# Patient Record
Sex: FEMALE | Race: WHITE | NOT HISPANIC OR LATINO | URBAN - METROPOLITAN AREA
[De-identification: names, ages, dates, MRNs, and addresses within clinical notes are randomized per-mention and may not be internally consistent; named-entity substitution may affect disease eponyms.]

---

## 2018-10-29 ENCOUNTER — EMERGENCY (EMERGENCY)
Facility: HOSPITAL | Age: 23
LOS: 1 days | Discharge: HOME | End: 2018-10-29
Attending: EMERGENCY MEDICINE
Payer: COMMERCIAL

## 2018-10-29 VITALS
TEMPERATURE: 98 F | WEIGHT: 190.04 LBS | RESPIRATION RATE: 17 BRPM | OXYGEN SATURATION: 97 % | HEIGHT: 64 IN | SYSTOLIC BLOOD PRESSURE: 117 MMHG | DIASTOLIC BLOOD PRESSURE: 77 MMHG | HEART RATE: 105 BPM

## 2018-10-29 VITALS
SYSTOLIC BLOOD PRESSURE: 106 MMHG | DIASTOLIC BLOOD PRESSURE: 70 MMHG | OXYGEN SATURATION: 99 % | TEMPERATURE: 98 F | HEART RATE: 62 BPM | RESPIRATION RATE: 18 BRPM

## 2018-10-29 LAB
ALBUMIN SERPL ELPH-MCNC: 4.5 G/DL — SIGNIFICANT CHANGE UP (ref 3.3–5)
ALP SERPL-CCNC: 189 U/L — HIGH (ref 40–120)
ALT FLD-CCNC: 40 U/L — SIGNIFICANT CHANGE UP (ref 10–45)
ANION GAP SERPL CALC-SCNC: 14 MMOL/L — SIGNIFICANT CHANGE UP (ref 5–17)
APPEARANCE UR: CLEAR — SIGNIFICANT CHANGE UP
AST SERPL-CCNC: 22 U/L — SIGNIFICANT CHANGE UP (ref 10–40)
BACTERIA # UR AUTO: NEGATIVE — SIGNIFICANT CHANGE UP
BASOPHILS # BLD AUTO: 0.1 K/UL — SIGNIFICANT CHANGE UP (ref 0–0.2)
BASOPHILS NFR BLD AUTO: 0.5 % — SIGNIFICANT CHANGE UP (ref 0–2)
BILIRUB SERPL-MCNC: 0.5 MG/DL — SIGNIFICANT CHANGE UP (ref 0.2–1.2)
BILIRUB UR-MCNC: NEGATIVE — SIGNIFICANT CHANGE UP
BUN SERPL-MCNC: 20 MG/DL — SIGNIFICANT CHANGE UP (ref 7–23)
CALCIUM SERPL-MCNC: 9.7 MG/DL — SIGNIFICANT CHANGE UP (ref 8.4–10.5)
CHLORIDE SERPL-SCNC: 103 MMOL/L — SIGNIFICANT CHANGE UP (ref 96–108)
CO2 SERPL-SCNC: 21 MMOL/L — LOW (ref 22–31)
COLOR SPEC: YELLOW — SIGNIFICANT CHANGE UP
CREAT SERPL-MCNC: 1.58 MG/DL — HIGH (ref 0.5–1.3)
DIFF PNL FLD: ABNORMAL
EOSINOPHIL # BLD AUTO: 0.1 K/UL — SIGNIFICANT CHANGE UP (ref 0–0.5)
EOSINOPHIL NFR BLD AUTO: 0.5 % — SIGNIFICANT CHANGE UP (ref 0–6)
EPI CELLS # UR: 7 /HPF — HIGH
GLUCOSE SERPL-MCNC: 92 MG/DL — SIGNIFICANT CHANGE UP (ref 70–99)
GLUCOSE UR QL: NEGATIVE — SIGNIFICANT CHANGE UP
HCT VFR BLD CALC: 45 % — SIGNIFICANT CHANGE UP (ref 34.5–45)
HGB BLD-MCNC: 16.1 G/DL — HIGH (ref 11.5–15.5)
HYALINE CASTS # UR AUTO: 2 /LPF — SIGNIFICANT CHANGE UP (ref 0–2)
KETONES UR-MCNC: NEGATIVE — SIGNIFICANT CHANGE UP
LEUKOCYTE ESTERASE UR-ACNC: NEGATIVE — SIGNIFICANT CHANGE UP
LIDOCAIN IGE QN: 19 U/L — SIGNIFICANT CHANGE UP (ref 7–60)
LYMPHOCYTES # BLD AUTO: 17.1 % — SIGNIFICANT CHANGE UP (ref 13–44)
LYMPHOCYTES # BLD AUTO: 2.6 K/UL — SIGNIFICANT CHANGE UP (ref 1–3.3)
MCHC RBC-ENTMCNC: 31.8 PG — SIGNIFICANT CHANGE UP (ref 27–34)
MCHC RBC-ENTMCNC: 35.7 GM/DL — SIGNIFICANT CHANGE UP (ref 32–36)
MCV RBC AUTO: 89.2 FL — SIGNIFICANT CHANGE UP (ref 80–100)
MONOCYTES # BLD AUTO: 1.4 K/UL — HIGH (ref 0–0.9)
MONOCYTES NFR BLD AUTO: 9.1 % — SIGNIFICANT CHANGE UP (ref 2–14)
NEUTROPHILS # BLD AUTO: 11.2 K/UL — HIGH (ref 1.8–7.4)
NEUTROPHILS NFR BLD AUTO: 72.8 % — SIGNIFICANT CHANGE UP (ref 43–77)
NITRITE UR-MCNC: NEGATIVE — SIGNIFICANT CHANGE UP
PH UR: 6 — SIGNIFICANT CHANGE UP (ref 5–8)
PLATELET # BLD AUTO: 341 K/UL — SIGNIFICANT CHANGE UP (ref 150–400)
POTASSIUM SERPL-MCNC: 3.9 MMOL/L — SIGNIFICANT CHANGE UP (ref 3.5–5.3)
POTASSIUM SERPL-SCNC: 3.9 MMOL/L — SIGNIFICANT CHANGE UP (ref 3.5–5.3)
PROT SERPL-MCNC: 7.5 G/DL — SIGNIFICANT CHANGE UP (ref 6–8.3)
PROT UR-MCNC: ABNORMAL
RBC # BLD: 5.05 M/UL — SIGNIFICANT CHANGE UP (ref 3.8–5.2)
RBC # FLD: 11.3 % — SIGNIFICANT CHANGE UP (ref 10.3–14.5)
RBC CASTS # UR COMP ASSIST: 14 /HPF — HIGH (ref 0–4)
SODIUM SERPL-SCNC: 138 MMOL/L — SIGNIFICANT CHANGE UP (ref 135–145)
SP GR SPEC: 1.03 — HIGH (ref 1.01–1.02)
UROBILINOGEN FLD QL: NEGATIVE — SIGNIFICANT CHANGE UP
WBC # BLD: 15.4 K/UL — HIGH (ref 3.8–10.5)
WBC # FLD AUTO: 15.4 K/UL — HIGH (ref 3.8–10.5)
WBC UR QL: 3 /HPF — SIGNIFICANT CHANGE UP (ref 0–5)

## 2018-10-29 PROCEDURE — 81001 URINALYSIS AUTO W/SCOPE: CPT

## 2018-10-29 PROCEDURE — 85027 COMPLETE CBC AUTOMATED: CPT

## 2018-10-29 PROCEDURE — 80053 COMPREHEN METABOLIC PANEL: CPT

## 2018-10-29 PROCEDURE — 76770 US EXAM ABDO BACK WALL COMP: CPT | Mod: 26

## 2018-10-29 PROCEDURE — 76775 US EXAM ABDO BACK WALL LIM: CPT | Mod: 26

## 2018-10-29 PROCEDURE — 83690 ASSAY OF LIPASE: CPT

## 2018-10-29 PROCEDURE — 96361 HYDRATE IV INFUSION ADD-ON: CPT

## 2018-10-29 PROCEDURE — 76775 US EXAM ABDO BACK WALL LIM: CPT

## 2018-10-29 PROCEDURE — 99284 EMERGENCY DEPT VISIT MOD MDM: CPT | Mod: 25

## 2018-10-29 PROCEDURE — 99284 EMERGENCY DEPT VISIT MOD MDM: CPT

## 2018-10-29 PROCEDURE — 96374 THER/PROPH/DIAG INJ IV PUSH: CPT

## 2018-10-29 RX ORDER — SODIUM CHLORIDE 9 MG/ML
1000 INJECTION INTRAMUSCULAR; INTRAVENOUS; SUBCUTANEOUS ONCE
Qty: 0 | Refills: 0 | Status: COMPLETED | OUTPATIENT
Start: 2018-10-29 | End: 2018-10-29

## 2018-10-29 RX ORDER — OXYCODONE HYDROCHLORIDE 5 MG/1
1 TABLET ORAL
Qty: 6 | Refills: 0 | OUTPATIENT
Start: 2018-10-29 | End: 2018-10-29

## 2018-10-29 RX ORDER — KETOROLAC TROMETHAMINE 30 MG/ML
30 SYRINGE (ML) INJECTION ONCE
Qty: 0 | Refills: 0 | Status: DISCONTINUED | OUTPATIENT
Start: 2018-10-29 | End: 2018-10-29

## 2018-10-29 RX ORDER — IBUPROFEN 200 MG
1 TABLET ORAL
Qty: 30 | Refills: 0 | OUTPATIENT
Start: 2018-10-29 | End: 2018-11-07

## 2018-10-29 RX ADMIN — Medication 30 MILLIGRAM(S): at 19:00

## 2018-10-29 RX ADMIN — SODIUM CHLORIDE 1000 MILLILITER(S): 9 INJECTION INTRAMUSCULAR; INTRAVENOUS; SUBCUTANEOUS at 19:30

## 2018-10-29 RX ADMIN — Medication 30 MILLIGRAM(S): at 15:05

## 2018-10-29 RX ADMIN — SODIUM CHLORIDE 2000 MILLILITER(S): 9 INJECTION INTRAMUSCULAR; INTRAVENOUS; SUBCUTANEOUS at 17:20

## 2018-10-29 RX ADMIN — SODIUM CHLORIDE 2000 MILLILITER(S): 9 INJECTION INTRAMUSCULAR; INTRAVENOUS; SUBCUTANEOUS at 15:04

## 2018-10-29 RX ADMIN — SODIUM CHLORIDE 1000 MILLILITER(S): 9 INJECTION INTRAMUSCULAR; INTRAVENOUS; SUBCUTANEOUS at 17:00

## 2018-10-29 NOTE — ED PROVIDER NOTE - MEDICAL DECISION MAKING DETAILS
22 year old female with flank pain, well appearing presents with mild mid abdominal tenderness, not fully consistent with stone. Unclear cause, No concern for intraabdominal infection. Requiring immediate treatment. Plan: will check labs, UA and re-evaluate.

## 2018-10-29 NOTE — ED PROVIDER NOTE - MUSCULOSKELETAL, MLM
Spine appears normal, range of motion is not limited, no muscle or joint tenderness Spine appears normal, range of motion is not limited, no muscle or joint tenderness, no midline spine tenderness

## 2018-10-29 NOTE — ED ADULT NURSE NOTE - NSIMPLEMENTINTERV_GEN_ALL_ED
Implemented All Universal Safety Interventions:  [place] to call system. Call bell, personal items and telephone within reach. Instruct patient to call for assistance. Room bathroom lighting operational. Non-slip footwear when patient is off stretcher. Physically safe environment: no spills, clutter or unnecessary equipment. Stretcher in lowest position, wheels locked, appropriate side rails in place.

## 2018-10-29 NOTE — ED PROVIDER NOTE - OBJECTIVE STATEMENT
22 year old female with pmhx of Anxiety, Attention deficit disorder, OCD and no pshx presents with 10/10 bilateral flank L worsen than R started yesterday worsening when lying down and improves with movement. Patient reports being on Doxycycline for lyme disease which is completed and also on Ceftin for wisdom teeth for x1 week but self-discontinued yesterday. Patient reports feeling nauseous en route to ED. Per patient's mother, had CT scan where it shows kidney inflamed in the past for similar pain in the past. Denies fever or chills. Denies diarrhea, vomiting, or constipation. Denies hematuria, dysuria or burning sensation urinating. No family hx of kidney stones or kidney infection. NKDA. Took Advil yesterday with no relief. 22 year old female with pmhx of Anxiety, Attention deficit disorder, OCD and no pshx presents with 10/10 bilateral flank L worsen than R started yesterday worsening when lying down and improves with movement. Patient reports being on Doxycycline for lyme disease which is completed and also on Ceftin for wisdom teeth for x1 week but self-discontinued yesterday. Patient reports feeling nauseous en route to ED. Per patient's mother, had CT scan in past x 2 where it shows kidney inflamed in the past for similar pain in the past - told they were likely kidney stones. Denies fever or chills. Denies diarrhea, vomiting, or constipation. Denies hematuria, dysuria or burning sensation urinating. No family hx of kidney stones or kidney infection. NKDA. Took Advil yesterday with no relief.

## 2018-10-29 NOTE — ED ADULT TRIAGE NOTE - CHIEF COMPLAINT QUOTE
left sided abd pain with radiation to the back, nausea with 1 episode fo vomiting - recently on doxycycline for lyme dx and ceftin for wisdom teeth

## 2018-10-29 NOTE — ED PROVIDER NOTE - ABDOMINAL EXAM
midline tenderness to L mid abdomen and R, no rebound or guarding, no tenderness to McBurney's point, no CVAT, no pain with movement, negative psoas sign, no midline tenderness mild tenderness to L mid abdomen and R, no rebound or guarding, no tenderness to McBurney's point, no CVAT, no pain with movement, negative psoas sign

## 2018-10-29 NOTE — ED PROVIDER NOTE - NS_ ATTENDINGSCRIBEDETAILS _ED_A_ED_FT
Vickie Garcia MD - Attending Physician: The scribe's documentation has been prepared under my direction and personally reviewed by me in its entirety. I confirm that the note above accurately reflects all work, treatment, procedures, and medical decision making performed by me.

## 2018-10-29 NOTE — ED PROVIDER NOTE - PROGRESS NOTE DETAILS
Pain improved. +Blood on UA, possible stone. US ordered r/o severe hydro Likely renal stone. Mild hydro. Noted Cr 1.5. Unknown baseline but with signs of mild dehydration. 2L NS given. Patient pain resolved and wants to go home. D/w patient and father need for follow-up with urology, repeat Cr levels. Discussed pain control and return precautions

## 2018-10-29 NOTE — ED ADULT NURSE NOTE - OBJECTIVE STATEMENT
23 y/o female with hx of kidney stone and migraine accompanied by mom c/o RUQ pain that radiates to right flank associated with n/v and constipation that initiated yesterday.  pt denies any melena or hematuria at this time.  pt is awake, alert and responsive to all stimuli.  no sob or respiratory distress noted.  pt was w/u and is awaiting md reeval.  will continue to monitor.

## 2018-11-08 ENCOUNTER — EMERGENCY (EMERGENCY)
Facility: HOSPITAL | Age: 23
LOS: 1 days | Discharge: HOME | End: 2018-11-08
Attending: EMERGENCY MEDICINE
Payer: COMMERCIAL

## 2018-11-08 VITALS
WEIGHT: 190.04 LBS | OXYGEN SATURATION: 99 % | SYSTOLIC BLOOD PRESSURE: 115 MMHG | DIASTOLIC BLOOD PRESSURE: 82 MMHG | TEMPERATURE: 98 F | HEIGHT: 64 IN | RESPIRATION RATE: 16 BRPM | HEART RATE: 85 BPM

## 2018-11-08 VITALS — RESPIRATION RATE: 16 BRPM | TEMPERATURE: 98 F | HEART RATE: 77 BPM | OXYGEN SATURATION: 99 %

## 2018-11-08 LAB
ALBUMIN SERPL ELPH-MCNC: 4.7 G/DL — SIGNIFICANT CHANGE UP (ref 3.3–5)
ALP SERPL-CCNC: 167 U/L — HIGH (ref 40–120)
ALT FLD-CCNC: 45 U/L — SIGNIFICANT CHANGE UP (ref 10–45)
ANION GAP SERPL CALC-SCNC: 15 MMOL/L — SIGNIFICANT CHANGE UP (ref 5–17)
APPEARANCE UR: CLEAR — SIGNIFICANT CHANGE UP
AST SERPL-CCNC: 25 U/L — SIGNIFICANT CHANGE UP (ref 10–40)
BACTERIA # UR AUTO: ABNORMAL
BASOPHILS # BLD AUTO: 0.1 K/UL — SIGNIFICANT CHANGE UP (ref 0–0.2)
BASOPHILS NFR BLD AUTO: 0.5 % — SIGNIFICANT CHANGE UP (ref 0–2)
BILIRUB SERPL-MCNC: 0.4 MG/DL — SIGNIFICANT CHANGE UP (ref 0.2–1.2)
BILIRUB UR-MCNC: NEGATIVE — SIGNIFICANT CHANGE UP
BUN SERPL-MCNC: 15 MG/DL — SIGNIFICANT CHANGE UP (ref 7–23)
CALCIUM SERPL-MCNC: 10.1 MG/DL — SIGNIFICANT CHANGE UP (ref 8.4–10.5)
CHLORIDE SERPL-SCNC: 106 MMOL/L — SIGNIFICANT CHANGE UP (ref 96–108)
CO2 SERPL-SCNC: 21 MMOL/L — LOW (ref 22–31)
COLOR SPEC: YELLOW — SIGNIFICANT CHANGE UP
CREAT SERPL-MCNC: 1.09 MG/DL — SIGNIFICANT CHANGE UP (ref 0.5–1.3)
DIFF PNL FLD: NEGATIVE — SIGNIFICANT CHANGE UP
EOSINOPHIL # BLD AUTO: 0.1 K/UL — SIGNIFICANT CHANGE UP (ref 0–0.5)
EOSINOPHIL NFR BLD AUTO: 0.5 % — SIGNIFICANT CHANGE UP (ref 0–6)
EPI CELLS # UR: 3 /HPF — SIGNIFICANT CHANGE UP
GAS PNL BLDV: SIGNIFICANT CHANGE UP
GLUCOSE SERPL-MCNC: 88 MG/DL — SIGNIFICANT CHANGE UP (ref 70–99)
GLUCOSE UR QL: NEGATIVE — SIGNIFICANT CHANGE UP
HCG UR QL: NEGATIVE — SIGNIFICANT CHANGE UP
HCT VFR BLD CALC: 43.9 % — SIGNIFICANT CHANGE UP (ref 34.5–45)
HGB BLD-MCNC: 16.2 G/DL — HIGH (ref 11.5–15.5)
HYALINE CASTS # UR AUTO: 0 /LPF — SIGNIFICANT CHANGE UP (ref 0–2)
KETONES UR-MCNC: NEGATIVE — SIGNIFICANT CHANGE UP
LEUKOCYTE ESTERASE UR-ACNC: NEGATIVE — SIGNIFICANT CHANGE UP
LYMPHOCYTES # BLD AUTO: 25 % — SIGNIFICANT CHANGE UP (ref 13–44)
LYMPHOCYTES # BLD AUTO: 3.8 K/UL — HIGH (ref 1–3.3)
MCHC RBC-ENTMCNC: 32.7 PG — SIGNIFICANT CHANGE UP (ref 27–34)
MCHC RBC-ENTMCNC: 37 GM/DL — HIGH (ref 32–36)
MCV RBC AUTO: 88.4 FL — SIGNIFICANT CHANGE UP (ref 80–100)
MONOCYTES # BLD AUTO: 0.9 K/UL — SIGNIFICANT CHANGE UP (ref 0–0.9)
MONOCYTES NFR BLD AUTO: 5.7 % — SIGNIFICANT CHANGE UP (ref 2–14)
NEUTROPHILS # BLD AUTO: 10.3 K/UL — HIGH (ref 1.8–7.4)
NEUTROPHILS NFR BLD AUTO: 68.2 % — SIGNIFICANT CHANGE UP (ref 43–77)
NITRITE UR-MCNC: NEGATIVE — SIGNIFICANT CHANGE UP
PH UR: 6 — SIGNIFICANT CHANGE UP (ref 5–8)
PLATELET # BLD AUTO: 353 K/UL — SIGNIFICANT CHANGE UP (ref 150–400)
POTASSIUM SERPL-MCNC: 4.1 MMOL/L — SIGNIFICANT CHANGE UP (ref 3.5–5.3)
POTASSIUM SERPL-SCNC: 4.1 MMOL/L — SIGNIFICANT CHANGE UP (ref 3.5–5.3)
PROT SERPL-MCNC: 7.6 G/DL — SIGNIFICANT CHANGE UP (ref 6–8.3)
PROT UR-MCNC: ABNORMAL
RBC # BLD: 4.97 M/UL — SIGNIFICANT CHANGE UP (ref 3.8–5.2)
RBC # FLD: 10.5 % — SIGNIFICANT CHANGE UP (ref 10.3–14.5)
RBC CASTS # UR COMP ASSIST: 5 /HPF — HIGH (ref 0–4)
SODIUM SERPL-SCNC: 142 MMOL/L — SIGNIFICANT CHANGE UP (ref 135–145)
SP GR SPEC: 1.02 — SIGNIFICANT CHANGE UP (ref 1.01–1.02)
UROBILINOGEN FLD QL: NEGATIVE — SIGNIFICANT CHANGE UP
WBC # BLD: 15.1 K/UL — HIGH (ref 3.8–10.5)
WBC # FLD AUTO: 15.1 K/UL — HIGH (ref 3.8–10.5)
WBC UR QL: 3 /HPF — SIGNIFICANT CHANGE UP (ref 0–5)

## 2018-11-08 PROCEDURE — 84132 ASSAY OF SERUM POTASSIUM: CPT

## 2018-11-08 PROCEDURE — 82330 ASSAY OF CALCIUM: CPT

## 2018-11-08 PROCEDURE — 96374 THER/PROPH/DIAG INJ IV PUSH: CPT

## 2018-11-08 PROCEDURE — 74176 CT ABD & PELVIS W/O CONTRAST: CPT

## 2018-11-08 PROCEDURE — 82803 BLOOD GASES ANY COMBINATION: CPT

## 2018-11-08 PROCEDURE — 84295 ASSAY OF SERUM SODIUM: CPT

## 2018-11-08 PROCEDURE — 80053 COMPREHEN METABOLIC PANEL: CPT

## 2018-11-08 PROCEDURE — 99284 EMERGENCY DEPT VISIT MOD MDM: CPT

## 2018-11-08 PROCEDURE — 85014 HEMATOCRIT: CPT

## 2018-11-08 PROCEDURE — 76770 US EXAM ABDO BACK WALL COMP: CPT

## 2018-11-08 PROCEDURE — 99284 EMERGENCY DEPT VISIT MOD MDM: CPT | Mod: 25

## 2018-11-08 PROCEDURE — 81001 URINALYSIS AUTO W/SCOPE: CPT

## 2018-11-08 PROCEDURE — 82947 ASSAY GLUCOSE BLOOD QUANT: CPT

## 2018-11-08 PROCEDURE — 85027 COMPLETE CBC AUTOMATED: CPT

## 2018-11-08 PROCEDURE — 83605 ASSAY OF LACTIC ACID: CPT

## 2018-11-08 PROCEDURE — 96361 HYDRATE IV INFUSION ADD-ON: CPT

## 2018-11-08 PROCEDURE — 82435 ASSAY OF BLOOD CHLORIDE: CPT

## 2018-11-08 PROCEDURE — 76770 US EXAM ABDO BACK WALL COMP: CPT | Mod: 26

## 2018-11-08 PROCEDURE — 82565 ASSAY OF CREATININE: CPT

## 2018-11-08 PROCEDURE — 81025 URINE PREGNANCY TEST: CPT

## 2018-11-08 PROCEDURE — 74176 CT ABD & PELVIS W/O CONTRAST: CPT | Mod: 26

## 2018-11-08 PROCEDURE — 87086 URINE CULTURE/COLONY COUNT: CPT

## 2018-11-08 RX ORDER — ONDANSETRON 8 MG/1
4 TABLET, FILM COATED ORAL ONCE
Qty: 0 | Refills: 0 | Status: COMPLETED | OUTPATIENT
Start: 2018-11-08 | End: 2018-11-08

## 2018-11-08 RX ORDER — SODIUM CHLORIDE 9 MG/ML
2000 INJECTION INTRAMUSCULAR; INTRAVENOUS; SUBCUTANEOUS ONCE
Qty: 0 | Refills: 0 | Status: COMPLETED | OUTPATIENT
Start: 2018-11-08 | End: 2018-11-08

## 2018-11-08 RX ORDER — ACETAMINOPHEN 500 MG
975 TABLET ORAL ONCE
Qty: 0 | Refills: 0 | Status: COMPLETED | OUTPATIENT
Start: 2018-11-08 | End: 2018-11-08

## 2018-11-08 RX ADMIN — SODIUM CHLORIDE 2000 MILLILITER(S): 9 INJECTION INTRAMUSCULAR; INTRAVENOUS; SUBCUTANEOUS at 11:32

## 2018-11-08 RX ADMIN — Medication 975 MILLIGRAM(S): at 10:31

## 2018-11-08 RX ADMIN — SODIUM CHLORIDE 2000 MILLILITER(S): 9 INJECTION INTRAMUSCULAR; INTRAVENOUS; SUBCUTANEOUS at 10:31

## 2018-11-08 RX ADMIN — ONDANSETRON 4 MILLIGRAM(S): 8 TABLET, FILM COATED ORAL at 10:32

## 2018-11-08 RX ADMIN — Medication 975 MILLIGRAM(S): at 11:32

## 2018-11-08 NOTE — ED PROVIDER NOTE - PROGRESS NOTE DETAILS
shared deciosn making w pt and mother requesting ct, ct within normal limits, no hydronephrosis, no nephrolithiasis, normal pelvic organs.  Cr improved from last visit, now normal.  Patient states she is feeling better, pain improved.  Patient is now sleeping comfortably in bed.  Discussed normal findings with mother and patient who expressed understanding.  Stated that patient could still have nephrolithiasis even with normal ct and patient has fu with her urologist tomorrow.  Low suspicion for pelvic pathology given normal ct, patient is a virgin so will not perform transvaginal and consider transabdominal low yield given low level of suspicion and likely diagnosis of kidney stone given history and recent hydro.  Patient stable for discharge and has good fu and return precautions should pain return. Tolerating PO.

## 2018-11-08 NOTE — ED PROVIDER NOTE - OBJECTIVE STATEMENT
22 year old female with pmhx of Anxiety, Attention deficit disorder, OCD and no pshx presents with 1d h/o sharp L flank pain with no radiation.  No aggravating or alleviating factors.  A/w nausea, no vomiting.  States she had similar pain last week for which she was seen in ED and have U/S showing mild L hydronephrosis and Cr 1.53 and d/w presumed kidney stone, had fu with her urologist and pain improved.  No pain is back.   Patient reports being on Doxycycline for lyme disease which is completed and also on Ceftin for wisdom teeth for x1 week but self-discontinued last week.   Has had CT scans in past which showed kidney inflammation and presumed stones.  Denies fever or chills. Denies diarrhea, vomiting, or constipation. Denies hematuria, dysuria or burning sensation urinating. 22 year old female with pmhx of Anxiety, Attention deficit disorder, OCD and no pshx presents with 1d h/o sharp L flank pain with no radiation.  No aggravating or alleviating factors.  A/w nausea, no vomiting.  States she had similar pain last week for which she was seen in ED and have U/S showing mild L hydronephrosis and Cr 1.53 and d/w presumed kidney stone, had fu with her urologist and pain improved.  No pain is back.   Patient reports being on Doxycycline for lyme disease which is completed and also on Ceftin for wisdom teeth for x1 week but self-discontinued last week.   Has had CT scans in past which showed kidney inflammation and presumed stones.  Denies fever or chills. Denies diarrhea, vomiting, or constipation. Denies hematuria, dysuria or burning sensation urinating.  Patient is a virgin.

## 2018-11-08 NOTE — ED ADULT NURSE NOTE - OBJECTIVE STATEMENT
Came in with abdominal pain. No distress. Breathing easy and non labored. Ambulatory. Pt not diaphoretic. No nausea or vomiting. in the ER at this time

## 2018-11-08 NOTE — ED PROVIDER NOTE - MEDICAL DECISION MAKING DETAILS
cathryn elise renal colic ho hematuira and flank pain on flomax now return of pain with n/v pos cvat - pocus to eval for hydro , iv gluids, not taking po -- antemetic and reeval

## 2018-11-08 NOTE — ED PROVIDER NOTE - PHYSICAL EXAMINATION
***GEN - well appearing; NAD   ***HEAD - NC/AT  ***EYES/NOSE - PEERL, EOMI, mucous membranes moist, no discharge   ***THROAT: Oral cavity and pharynx normal. No inflammation, swelling, exudate, or lesions.    ***NECK: supple, non-tender no lymphadenopathy  ***PULMONARY - CTA b/l, symmetric breath sounds.   ***CARDIAC- s1s2, RRR, no murmur  ***ABDOMEN - +BS, ND, NT, soft, no guarding, no rebound, no organomegaly  ***BACK - +L CVA tenderness, Normal  spine, no spinal TTP  ***EXTREMITIES - symmetric pulses, 2+ dp, capillary refill < 2 seconds, no clubbing, no cyanosis, no edema   ***SKIN - warm, dry, intact, no rash or bruising   ***NEUROLOGIC - a&o x3, CN 3-12 intact, sensation nl, motor 5/5 RUE/LUE/RLE/LLE

## 2018-11-08 NOTE — ED PROVIDER NOTE - CARE PLAN
Principal Discharge DX:	Flank pain Principal Discharge DX:	Flank pain  Assessment and plan of treatment:	1) You were here for flank pain.    2) Take motrin and tylenol as needed for pain.    3) Follow up with your primary doctor for further evaluation and to answer any questions you have.    4) Return to the emergency department if you experience worsening symptoms, pain, fever, chills, nausea, vomiting or other concerning symptoms.

## 2018-11-09 LAB
CULTURE RESULTS: SIGNIFICANT CHANGE UP
SPECIMEN SOURCE: SIGNIFICANT CHANGE UP

## 2018-11-10 NOTE — ED POST DISCHARGE NOTE - DETAILS
Discussed with patient and her mother Yenifer about UCx results.  Patient recently contacted by urgent care stating she had a + culture last week and they are sending her a prescription for Augmentin.  Encouraged patient and mother to take antibiotics as prescribed and to have repeat Urine culture with her PMD or urologist.  -Tonny Salinas PA-C

## 2019-07-24 NOTE — ED PROVIDER NOTE - PLAN OF CARE
1) You were here for flank pain.    2) Take motrin and tylenol as needed for pain.    3) Follow up with your primary doctor for further evaluation and to answer any questions you have.    4) Return to the emergency department if you experience worsening symptoms, pain, fever, chills, nausea, vomiting or other concerning symptoms. 22-Jul-2019

## 2020-03-01 ENCOUNTER — EMERGENCY (EMERGENCY)
Facility: HOSPITAL | Age: 25
LOS: 1 days | Discharge: HOME | End: 2020-03-01
Attending: STUDENT IN AN ORGANIZED HEALTH CARE EDUCATION/TRAINING PROGRAM
Payer: COMMERCIAL

## 2020-03-01 VITALS
SYSTOLIC BLOOD PRESSURE: 109 MMHG | HEART RATE: 100 BPM | TEMPERATURE: 98 F | RESPIRATION RATE: 18 BRPM | OXYGEN SATURATION: 98 % | WEIGHT: 177.91 LBS | DIASTOLIC BLOOD PRESSURE: 83 MMHG | HEIGHT: 64 IN

## 2020-03-01 LAB
ALBUMIN SERPL ELPH-MCNC: 4.5 G/DL — SIGNIFICANT CHANGE UP (ref 3.3–5)
ALP SERPL-CCNC: 107 U/L — SIGNIFICANT CHANGE UP (ref 40–120)
ALT FLD-CCNC: 66 U/L — HIGH (ref 10–45)
ANION GAP SERPL CALC-SCNC: 17 MMOL/L — SIGNIFICANT CHANGE UP (ref 5–17)
APPEARANCE UR: ABNORMAL
AST SERPL-CCNC: 40 U/L — SIGNIFICANT CHANGE UP (ref 10–40)
BACTERIA # UR AUTO: NEGATIVE — SIGNIFICANT CHANGE UP
BASOPHILS # BLD AUTO: 0.07 K/UL — SIGNIFICANT CHANGE UP (ref 0–0.2)
BASOPHILS NFR BLD AUTO: 0.5 % — SIGNIFICANT CHANGE UP (ref 0–2)
BILIRUB SERPL-MCNC: 0.2 MG/DL — SIGNIFICANT CHANGE UP (ref 0.2–1.2)
BILIRUB UR-MCNC: NEGATIVE — SIGNIFICANT CHANGE UP
BUN SERPL-MCNC: 18 MG/DL — SIGNIFICANT CHANGE UP (ref 7–23)
CALCIUM SERPL-MCNC: 10.1 MG/DL — SIGNIFICANT CHANGE UP (ref 8.4–10.5)
CHLORIDE SERPL-SCNC: 105 MMOL/L — SIGNIFICANT CHANGE UP (ref 96–108)
CO2 SERPL-SCNC: 20 MMOL/L — LOW (ref 22–31)
COLOR SPEC: YELLOW — SIGNIFICANT CHANGE UP
CREAT SERPL-MCNC: 0.88 MG/DL — SIGNIFICANT CHANGE UP (ref 0.5–1.3)
DIFF PNL FLD: NEGATIVE — SIGNIFICANT CHANGE UP
EOSINOPHIL # BLD AUTO: 0.52 K/UL — HIGH (ref 0–0.5)
EOSINOPHIL NFR BLD AUTO: 3.9 % — SIGNIFICANT CHANGE UP (ref 0–6)
EPI CELLS # UR: 4 /HPF — SIGNIFICANT CHANGE UP
GLUCOSE SERPL-MCNC: 102 MG/DL — HIGH (ref 70–99)
GLUCOSE UR QL: NEGATIVE — SIGNIFICANT CHANGE UP
HCT VFR BLD CALC: 44.1 % — SIGNIFICANT CHANGE UP (ref 34.5–45)
HGB BLD-MCNC: 15.2 G/DL — SIGNIFICANT CHANGE UP (ref 11.5–15.5)
HYALINE CASTS # UR AUTO: 1 /LPF — SIGNIFICANT CHANGE UP (ref 0–2)
IMM GRANULOCYTES NFR BLD AUTO: 0.2 % — SIGNIFICANT CHANGE UP (ref 0–1.5)
KETONES UR-MCNC: SIGNIFICANT CHANGE UP
LEUKOCYTE ESTERASE UR-ACNC: NEGATIVE — SIGNIFICANT CHANGE UP
LYMPHOCYTES # BLD AUTO: 22.6 % — SIGNIFICANT CHANGE UP (ref 13–44)
LYMPHOCYTES # BLD AUTO: 3.02 K/UL — SIGNIFICANT CHANGE UP (ref 1–3.3)
MCHC RBC-ENTMCNC: 30.6 PG — SIGNIFICANT CHANGE UP (ref 27–34)
MCHC RBC-ENTMCNC: 34.5 GM/DL — SIGNIFICANT CHANGE UP (ref 32–36)
MCV RBC AUTO: 88.7 FL — SIGNIFICANT CHANGE UP (ref 80–100)
MONOCYTES # BLD AUTO: 1.08 K/UL — HIGH (ref 0–0.9)
MONOCYTES NFR BLD AUTO: 8.1 % — SIGNIFICANT CHANGE UP (ref 2–14)
NEUTROPHILS # BLD AUTO: 8.63 K/UL — HIGH (ref 1.8–7.4)
NEUTROPHILS NFR BLD AUTO: 64.7 % — SIGNIFICANT CHANGE UP (ref 43–77)
NITRITE UR-MCNC: NEGATIVE — SIGNIFICANT CHANGE UP
NRBC # BLD: 0 /100 WBCS — SIGNIFICANT CHANGE UP (ref 0–0)
PH UR: 6 — SIGNIFICANT CHANGE UP (ref 5–8)
PLATELET # BLD AUTO: 420 K/UL — HIGH (ref 150–400)
POTASSIUM SERPL-MCNC: 3.5 MMOL/L — SIGNIFICANT CHANGE UP (ref 3.5–5.3)
POTASSIUM SERPL-SCNC: 3.5 MMOL/L — SIGNIFICANT CHANGE UP (ref 3.5–5.3)
PROT SERPL-MCNC: 7.3 G/DL — SIGNIFICANT CHANGE UP (ref 6–8.3)
PROT UR-MCNC: ABNORMAL
RBC # BLD: 4.97 M/UL — SIGNIFICANT CHANGE UP (ref 3.8–5.2)
RBC # FLD: 11.5 % — SIGNIFICANT CHANGE UP (ref 10.3–14.5)
RBC CASTS # UR COMP ASSIST: 12 /HPF — HIGH (ref 0–4)
SODIUM SERPL-SCNC: 142 MMOL/L — SIGNIFICANT CHANGE UP (ref 135–145)
SP GR SPEC: 1.03 — HIGH (ref 1.01–1.02)
UROBILINOGEN FLD QL: NEGATIVE — SIGNIFICANT CHANGE UP
WBC # BLD: 13.35 K/UL — HIGH (ref 3.8–10.5)
WBC # FLD AUTO: 13.35 K/UL — HIGH (ref 3.8–10.5)
WBC UR QL: 5 /HPF — SIGNIFICANT CHANGE UP (ref 0–5)

## 2020-03-01 PROCEDURE — 80053 COMPREHEN METABOLIC PANEL: CPT

## 2020-03-01 PROCEDURE — 81001 URINALYSIS AUTO W/SCOPE: CPT

## 2020-03-01 PROCEDURE — 96375 TX/PRO/DX INJ NEW DRUG ADDON: CPT

## 2020-03-01 PROCEDURE — 99284 EMERGENCY DEPT VISIT MOD MDM: CPT | Mod: 25

## 2020-03-01 PROCEDURE — 76770 US EXAM ABDO BACK WALL COMP: CPT | Mod: 26

## 2020-03-01 PROCEDURE — 76770 US EXAM ABDO BACK WALL COMP: CPT

## 2020-03-01 PROCEDURE — 87086 URINE CULTURE/COLONY COUNT: CPT

## 2020-03-01 PROCEDURE — 85027 COMPLETE CBC AUTOMATED: CPT

## 2020-03-01 PROCEDURE — 99285 EMERGENCY DEPT VISIT HI MDM: CPT

## 2020-03-01 PROCEDURE — 96374 THER/PROPH/DIAG INJ IV PUSH: CPT

## 2020-03-01 RX ORDER — KETOROLAC TROMETHAMINE 30 MG/ML
15 SYRINGE (ML) INJECTION ONCE
Refills: 0 | Status: DISCONTINUED | OUTPATIENT
Start: 2020-03-01 | End: 2020-03-01

## 2020-03-01 RX ORDER — SODIUM CHLORIDE 9 MG/ML
1000 INJECTION INTRAMUSCULAR; INTRAVENOUS; SUBCUTANEOUS ONCE
Refills: 0 | Status: COMPLETED | OUTPATIENT
Start: 2020-03-01 | End: 2020-03-01

## 2020-03-01 RX ORDER — ONDANSETRON 8 MG/1
4 TABLET, FILM COATED ORAL ONCE
Refills: 0 | Status: COMPLETED | OUTPATIENT
Start: 2020-03-01 | End: 2020-03-01

## 2020-03-01 RX ADMIN — ONDANSETRON 4 MILLIGRAM(S): 8 TABLET, FILM COATED ORAL at 22:22

## 2020-03-01 RX ADMIN — Medication 15 MILLIGRAM(S): at 23:27

## 2020-03-01 RX ADMIN — SODIUM CHLORIDE 1000 MILLILITER(S): 9 INJECTION INTRAMUSCULAR; INTRAVENOUS; SUBCUTANEOUS at 22:22

## 2020-03-01 NOTE — ED ADULT NURSE NOTE - OBJECTIVE STATEMENT
Pt is a 24y Female c/o R flank pain starting 1 week ago which worsened today. Pt PMHx Kidney stones, PCOS (taking Metformin), Anxiety, OCD, ADD. Pt states pain starts in he flank and travels to her R side. Pt complaining of Nausea and vomiting 1x. Pt denies sexual activity, changes in urine color, or pain on urination. Pt appears anxious. Pt prefers to go by [place]. Pt resting comfortably in bed with mother at bedside. Pt educated on call bell use and call bell placed at bedside. Pt safety maintained.

## 2020-03-01 NOTE — ED PROVIDER NOTE - NSFOLLOWUPINSTRUCTIONS_ED_ALL_ED_FT
1.  Stay Hydrated  2. Take zofran 4mgs every 6 hrs as needed for nausea.  Take Ibuprofen 600mgs every 6 hrs as needed for mild/moderate pain.    3.  Follow up with your PMD in 2-3 days       Follow up with Urology upon discharge.  4.  Return to the ER for worsening pain, persistent nausea/vomiting, fevers/chills or any other concerning symptoms 1.  Stay Hydrated  2. Take zofran 4mgs every 6 hrs as needed for nausea.  Take Ibuprofen 600mgs every 6 hrs as needed for mild/moderate pain.    3.  Follow up with your PMD in 2-3 days       Follow up with your Urologist upon discharge.  4.  Return to the ER for worsening pain, persistent nausea/vomiting, fevers/chills or any other concerning symptoms

## 2020-03-01 NOTE — ED ADULT NURSE NOTE - NSIMPLEMENTINTERV_GEN_ALL_ED
Implemented All Universal Safety Interventions:  [place] to call system. Call bell, personal items and telephone within reach. Instruct patient to call for assistance. Room bathroom lighting operational. Non-slip footwear when patient is off stretcher. Physically safe environment: no spills, clutter or unnecessary equipment. Stretcher in lowest position, wheels locked, appropriate side rails in place.

## 2020-03-01 NOTE — ED PROVIDER NOTE - PHYSICAL EXAMINATION
A&Ox3, NAD. NCAT. PERRL, EOMI. Neck supple, no LAD. Lungs CTAB. +S1S2, RRR, No m/r/g. Abd soft, NT/ND, +BS, no rebound or guarding. Extremities: cap refill <2, pulses in distal extremities 4+, no edema. Skin without rash. CN II-XII intact. Strength 5/5 UE/LE. Sensations intact throughout. Gait steady. No CVA TTP.

## 2020-03-01 NOTE — ED PROVIDER NOTE - ATTENDING CONTRIBUTION TO CARE
25 yo female with PMH OCD, anxiety, PCOS, recurrent kidney stones (never needing intervention) presenting with intermittent left flank pain radiating to abdomen x 1 week which intensified approx 2 hours ago. a/w nausea so didn't take medications at home. Denies f/c, vomiting, cp, sob, vaginal symptoms, diarrhea. Denies PSH in abdomen. patient reports feels similar to previous stone  AP - likely renal colic. will get ultrasound to eval for hydro. ua/labs, pain control. likely uro f/u

## 2020-03-01 NOTE — ED PROVIDER NOTE - PROGRESS NOTE DETAILS
patient feeling better. likely renal colic. tolerating PO here. pain controlled. patient and mom comfortable with discharge. urology follow up.

## 2020-03-01 NOTE — ED ADULT NURSE NOTE - PMH
Anxiety    Attention deficit disorder    Kidney stones    OCD (obsessive compulsive disorder)    PCOS (polycystic ovarian syndrome)

## 2020-03-01 NOTE — ED ADULT NURSE NOTE - PRO INTERPRETER NEED 2
Medicare Wellness Visit  Plan for Preventive Care    A good way for you to stay healthy is to use preventive care.  Medicare covers many services that can help you stay healthy.* The goal of these services is to find any health problems as quickly as possible. Finding problems early can help make them easier to treat.  Your personal plan below lists the services you may need and when they are due.     Health Maintenance Summary     Topic Due On Due Status Completed On    Medicare Wellness Visit Feb 24, 2018 Overdue Feb 24, 2017    IMMUNIZATION - DTaP/Tdap/Td Jun 4, 2009 Overdue Vladimir 3, 2009    Immunization-Influenza Sep 1, 2018 Not Due Feb 14, 2011    Depression Screening Jul 9, 2019 Not Due Jul 9, 2018      Completed Feb 22, 2016           Preventive Care for Women and Men    Heart Screenings (Cardiovascular):  · Blood tests are used to check your cholesterol, lipid and triglyceride levels. High levels can increase your risk for heart disease and stroke. High levels can be treated with medications, diet and exercise. Lowering your levels can help keep your heart and blood vessels healthy.  Your provider will order these tests if they are needed.    · An ultrasound is done to see if you have an abdominal aortic aneurysm (AAA).  This is an enlargement of one of the main blood vessels that delivers blood to the body.   In the [place], 9,000 deaths are caused by AAA.  You may not even know you have this problem and as many as 1 in 3 people will have a serious problem if it is not treated.  Early diagnosis allows for more effective treatment and cure.  If you have a family history of AAA or are a male age 65-75 who has smoked, you are at higher risk of an AAA.  Your provider can order this test, if needed.    Colorectal Screening:  · There are many tests that are used to check for cancer of your colon and rectum. You and your provider should discuss what test is best for you and when to have it done.  Options  include:  · Screening Colonoscopy: exam of the entire colon, seen through a flexible lighted tube.  · Flexible Sigmoidoscopy: exam of the last third (sigmoid portion) of the colon and rectum, seen through a flexible lighted tube.  · Cologuard DNA stool test: a sample of your stool is used to screen for cancer and unseen blood in your stool.  · Fecal Occult Blood Test: a sample of your stool is studied to find any unseen blood    Flu Shot:  · An immunization that helps to prevent influenza (the flu). You should get this every year. The best time to get the shot is in the fall.    Pneumococcal Shot:  • Vaccines are available that can help prevent pneumococcal disease, which is any type of infection caused by Streptococcus pneumoniae bacteria.   Their use can prevent some cases of pneumonia, meningitis, and sepsis. There are two types of pneumococcal vaccines:   o Conjugate vaccines (PCV-13 or Prevnar 13®) - helps protect against the 13 types of pneumococcal bacteria that are the most common causes of serious infections in children and adults.    o Polysaccharide vaccine (PPSV23 or Jekdlmgvn83®) - helps protect against 23 types of pneumococcal bacteria for patients who are recommended to get it.  These vaccines should be given at least 12 months apart.  A booster is usually not needed.     Hepatitis B Shot:  · An immunization that helps to protect people from getting Hepatitis B. Hepatitis B is a virus that spreads through contact with infected blood or body fluids. Many people with the virus do not have symptoms.  The virus can lead to serious problems, such as liver disease. Some people are at higher risk than others. Your doctor will tell you if you need this shot.     Diabetes Screening:  · A test to measure sugar (glucose) in your blood is called a fasting blood sugar. Fasting means you cannot have food or drink for at least 8 hours before the test. This test can detect diabetes long before you may notice  symptoms.    Glaucoma Screening:  · Glaucoma screening is performed by your eye doctor. The test measures the fluid pressure inside your eyes to determine if you have glaucoma.     Hepatitis C Screening:  · A blood test to see if you have the hepatitis C virus.  Hepatitis C attacks the liver and is a major cause of chronic liver disease.  Medicare will cover a single screening for all adults born between 1945 & 1965, or high risk patients (people who have injected illegal drugs or people who have had blood transfusions).  High risk patients who continue to inject illegal drugs can be screened for Hepatitis C every year.    Smoking and Tobacco-Use Cessation Counseling:  · Tobacco is the single greatest cause of disease and early death in our country today. Medication and counseling together can increase a person’s chance of quitting for good.   · Medicare covers two quitting attempts per year, with four counseling sessions per attempt (eight sessions in a 12 month period)    Preventive Screening tests for Women    Screening Mammograms and Breast Exams:  · An x-ray of your breasts to check for breast cancer before you or your doctor may be able to feel it.  If breast cancer is found early it can usually be treated with success.    Pelvic Exams and Pap Tests:  · An exam to check for cervical and vaginal cancer. A Pap test is a lab test in which cells are taken from your cervix and sent to the lab to look for signs of cervical cancer. If cancer of the cervix is found early, chances for a cure are good. Testing can generally end at age 65, or if a woman has a hysterectomy for a benign condition. Your provider may recommend more frequent testing if certain abnormal results are found.    Bone Mass Measurements:  · A painless x-ray of your bone density to see if you are at risk for a broken bone. Bone density refers to the thickness of bones or how tightly the bone tissue is packed.    Preventive Screening tests for  Men    Prostate Screening:  · PSA - Prostate Cancer blood test.  Experts do not recommend routine screening of healthy men with no signs or symptoms of prostate disease.  However, men should not ignore urinary symptoms, and should discuss their family history with their doctor.    *Medicare pays for many preventive services to keep you healthy. For some of these services, you might have to pay a deductible, coinsurance, and / or copayment.  The amounts vary depending on the type of services you need and the kind of Medicare health plan you have.               English

## 2020-03-01 NOTE — ED PROVIDER NOTE - PATIENT PORTAL LINK FT
You can access the FollowMyHealth Patient Portal offered by [facility] by registering at the following website: [email or web address]. By joining [email or web address]’s FollowMyHealth portal, you will also be able to view your health information using other applications (apps) compatible with our system.

## 2020-03-01 NOTE — ED PROVIDER NOTE - OBJECTIVE STATEMENT
25 yo female with PMH OCD, anxiety, PCOS, recurrent kidney stones (never needing intervention) presenting with intermittent left flank pain radiating to abdomen x 1 week which intensified approx 2 hours ago at rest and has been constant 7/10 pain since. Pain feels similar to previous kidney stones. + nausea, no vomiting, has not taken medications for the pain. No fevers, chills, dysuria, hematuria, diarrhea, constipation.

## 2020-03-02 VITALS
OXYGEN SATURATION: 100 % | HEART RATE: 70 BPM | RESPIRATION RATE: 18 BRPM | DIASTOLIC BLOOD PRESSURE: 75 MMHG | SYSTOLIC BLOOD PRESSURE: 96 MMHG

## 2020-03-02 RX ORDER — OXYCODONE HYDROCHLORIDE 5 MG/1
1 TABLET ORAL
Qty: 5 | Refills: 0
Start: 2020-03-02

## 2020-03-03 PROBLEM — N20.0 CALCULUS OF KIDNEY: Chronic | Status: ACTIVE | Noted: 2020-03-01

## 2020-03-03 PROBLEM — E28.2 POLYCYSTIC OVARIAN SYNDROME: Chronic | Status: ACTIVE | Noted: 2020-03-01

## 2020-03-03 LAB
CULTURE RESULTS: SIGNIFICANT CHANGE UP
SPECIMEN SOURCE: SIGNIFICANT CHANGE UP

## 2020-03-04 NOTE — ED POST DISCHARGE NOTE - DETAILS
lvm to call back admin line - Octavia Samaniego PA-C 3/5/20: pt contacted informed of contaminated urine. pt feeling better, states she passed a kidney stone, plans to follow up this week. return precautions given. - Quintne Whitfield PA-C

## 2020-07-18 ENCOUNTER — APPOINTMENT (OUTPATIENT)
Dept: ULTRASOUND IMAGING | Facility: CLINIC | Age: 25
End: 2020-07-18

## 2021-03-05 ENCOUNTER — NON-APPOINTMENT (OUTPATIENT)
Age: 26
End: 2021-03-05

## 2021-08-17 NOTE — ED ADULT TRIAGE NOTE - SPO2 (%)
[age]F hx breast CA on immunologics p/w several days nausea/vomiting - on exam appearing dehydrated. Low suspicion for acute intra-abdominal etiology - suspect secondary symptoms 2/2 treatment modality - will send basic labs w/ gas, antiemetic, fluids, cxr, ua.

## 2021-11-16 ENCOUNTER — APPOINTMENT (OUTPATIENT)
Dept: INTERNAL MEDICINE | Facility: CLINIC | Age: 26
End: 2021-11-16
Payer: COMMERCIAL

## 2021-11-16 VITALS
RESPIRATION RATE: 16 BRPM | HEART RATE: 90 BPM | SYSTOLIC BLOOD PRESSURE: 144 MMHG | BODY MASS INDEX: 24.31 KG/M2 | WEIGHT: 142.38 LBS | TEMPERATURE: 98 F | OXYGEN SATURATION: 99 % | HEIGHT: 64.37 IN | DIASTOLIC BLOOD PRESSURE: 77 MMHG

## 2021-11-16 DIAGNOSIS — F81.89 OTHER DEVELOPMENTAL DISORDERS OF SCHOLASTIC SKILLS: ICD-10-CM

## 2021-11-16 DIAGNOSIS — Z86.19 PERSONAL HISTORY OF OTHER INFECTIOUS AND PARASITIC DISEASES: ICD-10-CM

## 2021-11-16 DIAGNOSIS — Z83.438 FAMILY HISTORY OF OTHER DISORDER OF LIPOPROTEIN METABOLISM AND OTHER LIPIDEMIA: ICD-10-CM

## 2021-11-16 DIAGNOSIS — Z83.3 FAMILY HISTORY OF DIABETES MELLITUS: ICD-10-CM

## 2021-11-16 DIAGNOSIS — Z82.79 FAMILY HISTORY OF OTHER CONGENITAL MALFORMATIONS, DEFORMATIONS AND CHROMOSOMAL ABNORMALITIES: ICD-10-CM

## 2021-11-16 DIAGNOSIS — Z87.442 PERSONAL HISTORY OF URINARY CALCULI: ICD-10-CM

## 2021-11-16 DIAGNOSIS — G43.009 MIGRAINE W/OUT AURA, NOT INTRACTABLE, W/OUT STATUS MIGRAINOSUS: ICD-10-CM

## 2021-11-16 DIAGNOSIS — Z82.49 FAMILY HISTORY OF ISCHEMIC HEART DISEASE AND OTHER DISEASES OF THE CIRCULATORY SYSTEM: ICD-10-CM

## 2021-11-16 PROCEDURE — 99385 PREV VISIT NEW AGE 18-39: CPT | Mod: 25

## 2021-11-16 PROCEDURE — 36415 COLL VENOUS BLD VENIPUNCTURE: CPT

## 2021-11-16 PROCEDURE — 99203 OFFICE O/P NEW LOW 30 MIN: CPT | Mod: 25

## 2021-11-16 RX ORDER — METFORMIN ER 750 MG 750 MG/1
750 TABLET ORAL
Qty: 180 | Refills: 1 | Status: ACTIVE | COMMUNITY

## 2021-11-22 ENCOUNTER — APPOINTMENT (OUTPATIENT)
Dept: CARDIOLOGY | Facility: CLINIC | Age: 26
End: 2021-11-22

## 2021-11-24 NOTE — HISTORY OF PRESENT ILLNESS
[Parent] : parent [de-identified] : 24 y/o female patient is new to the office presents today with mother for annual CPE/fasting labs\par - HCM: never had PAP \par - f/u PCOS - following every 3-4 months with endocrinology, last week at  appointment increased Metformin to 750mg BID, stable \par - f/u OCD/ADHD/Learning disorder - compliant with medication, following with Dr. Moran psychiatrist monthly and therapist weekly\par - f/u chronic fatigue syndrome/hx of lyme disease - currently following with program in [facility] for chronic lyme disease, has already been given doxycycline >30 days which did not improve symptoms, frustrated about not having answers/diagnosis to symptoms

## 2021-11-24 NOTE — HEALTH RISK ASSESSMENT
[] : No [No] : In the past 12 months have you used drugs other than those required for medical reasons? No [0] : 2) Feeling down, depressed, or hopeless: Not at all (0) [PHQ-2 Negative - No further assessment needed] : PHQ-2 Negative - No further assessment needed [[id number]] : 0 [Change in mental status noted] : No change in mental status noted [None] : None [With Family] : lives with family [Unemployed] : unemployed [Single] : single [Sexually Active] : not sexually active [Feels Safe at Home] : Feels safe at home [Reports changes in hearing] : Reports no changes in hearing [Reports changes in vision] : Reports no changes in vision [Reports changes in dental health] : Reports no changes in dental health

## 2021-11-24 NOTE — REVIEW OF SYSTEMS
[Fatigue] : fatigue [Joint Pain] : joint pain [Joint Stiffness] : joint stiffness [Joint Swelling] : no joint swelling [Muscle Weakness] : muscle weakness [Muscle Pain] : muscle pain [Back Pain] : no back pain [Negative] : Heme/Lymph

## 2021-11-26 LAB
25(OH)D3 SERPL-MCNC: 41.9 NG/ML
ALBUMIN SERPL ELPH-MCNC: 4.7 G/DL
ALP BLD-CCNC: 83 U/L
ALT SERPL-CCNC: 11 U/L
ANION GAP SERPL CALC-SCNC: 11 MMOL/L
APPEARANCE: ABNORMAL
AST SERPL-CCNC: 9 U/L
BACTERIA: NEGATIVE
BASOPHILS # BLD AUTO: 0.06 K/UL
BASOPHILS NFR BLD AUTO: 0.6 %
BILIRUB SERPL-MCNC: 0.2 MG/DL
BILIRUBIN URINE: NEGATIVE
BLOOD URINE: NEGATIVE
BUN SERPL-MCNC: 20 MG/DL
CALCIUM SERPL-MCNC: 9.9 MG/DL
CHLORIDE SERPL-SCNC: 107 MMOL/L
CHOLEST SERPL-MCNC: 141 MG/DL
CO2 SERPL-SCNC: 23 MMOL/L
COLOR: YELLOW
CREAT SERPL-MCNC: 0.83 MG/DL
EOSINOPHIL # BLD AUTO: 0.16 K/UL
EOSINOPHIL NFR BLD AUTO: 1.6 %
FOLATE SERPL-MCNC: >20 NG/ML
GLUCOSE QUALITATIVE U: NEGATIVE
GLUCOSE SERPL-MCNC: 77 MG/DL
HCT VFR BLD CALC: 40.5 %
HDLC SERPL-MCNC: 46 MG/DL
HGB BLD-MCNC: 14 G/DL
HYALINE CASTS: 0 /LPF
IMM GRANULOCYTES NFR BLD AUTO: 0.2 %
IRON SATN MFR SERPL: 26 %
IRON SERPL-MCNC: 93 UG/DL
KETONES URINE: NEGATIVE
LDLC SERPL CALC-MCNC: 75 MG/DL
LEUKOCYTE ESTERASE URINE: NEGATIVE
LYMPHOCYTES # BLD AUTO: 2.92 K/UL
LYMPHOCYTES NFR BLD AUTO: 28.8 %
MAGNESIUM SERPL-MCNC: 2 MG/DL
MAN DIFF?: NORMAL
MCHC RBC-ENTMCNC: 32.3 PG
MCHC RBC-ENTMCNC: 34.6 GM/DL
MCV RBC AUTO: 93.3 FL
MICROSCOPIC-UA: NORMAL
MONOCYTES # BLD AUTO: 0.66 K/UL
MONOCYTES NFR BLD AUTO: 6.5 %
NEUTROPHILS # BLD AUTO: 6.31 K/UL
NEUTROPHILS NFR BLD AUTO: 62.3 %
NITRITE URINE: NEGATIVE
NONHDLC SERPL-MCNC: 95 MG/DL
PH URINE: 6.5
PHOSPHATE SERPL-MCNC: 4 MG/DL
PLATELET # BLD AUTO: 324 K/UL
POTASSIUM SERPL-SCNC: 4.3 MMOL/L
PROT SERPL-MCNC: 6.9 G/DL
PROTEIN URINE: NEGATIVE
RBC # BLD: 4.34 M/UL
RBC # FLD: 11.4 %
RED BLOOD CELLS URINE: 4 /HPF
SODIUM SERPL-SCNC: 141 MMOL/L
SPECIFIC GRAVITY URINE: 1.02
SQUAMOUS EPITHELIAL CELLS: 2 /HPF
T3FREE SERPL-MCNC: 2.56 PG/ML
T4 FREE SERPL-MCNC: 1.3 NG/DL
THYROGLOB AB SERPL-ACNC: <20 IU/ML
THYROPEROXIDASE AB SERPL IA-ACNC: 11.5 IU/ML
TIBC SERPL-MCNC: 364 UG/DL
TRIGL SERPL-MCNC: 104 MG/DL
TSH SERPL-ACNC: 1.41 UIU/ML
UIBC SERPL-MCNC: 271 UG/DL
UROBILINOGEN URINE: NORMAL
VIT B12 SERPL-MCNC: 492 PG/ML
WBC # FLD AUTO: 10.13 K/UL
WHITE BLOOD CELLS URINE: 1 /HPF

## 2022-11-18 ENCOUNTER — APPOINTMENT (OUTPATIENT)
Dept: INTERNAL MEDICINE | Facility: CLINIC | Age: 27
End: 2022-11-18

## 2022-11-18 VITALS
OXYGEN SATURATION: 99 % | DIASTOLIC BLOOD PRESSURE: 76 MMHG | WEIGHT: 139 LBS | SYSTOLIC BLOOD PRESSURE: 120 MMHG | HEIGHT: 64.37 IN | BODY MASS INDEX: 23.73 KG/M2 | RESPIRATION RATE: 16 BRPM | TEMPERATURE: 98 F | HEART RATE: 102 BPM

## 2022-11-18 DIAGNOSIS — E88.81 METABOLIC SYNDROME: ICD-10-CM

## 2022-11-18 DIAGNOSIS — E28.2 POLYCYSTIC OVARIAN SYNDROME: ICD-10-CM

## 2022-11-18 DIAGNOSIS — F90.9 ATTENTION-DEFICIT HYPERACTIVITY DISORDER, UNSPECIFIED TYPE: ICD-10-CM

## 2022-11-18 DIAGNOSIS — R53.82 CHRONIC FATIGUE, UNSPECIFIED: ICD-10-CM

## 2022-11-18 DIAGNOSIS — F42.9 OBSESSIVE-COMPULSIVE DISORDER, UNSPECIFIED: ICD-10-CM

## 2022-11-18 PROCEDURE — 99395 PREV VISIT EST AGE 18-39: CPT | Mod: 25

## 2022-11-18 PROCEDURE — G0444 DEPRESSION SCREEN ANNUAL: CPT | Mod: 59

## 2022-11-18 PROCEDURE — 36415 COLL VENOUS BLD VENIPUNCTURE: CPT

## 2022-11-18 RX ORDER — SUMATRIPTAN SUCCINATE 50 MG/1
50 TABLET, FILM COATED ORAL
Refills: 0 | Status: DISCONTINUED | COMMUNITY
End: 2022-11-18

## 2022-11-18 RX ORDER — FLUVOXAMINE MALEATE 50 MG/1
50 TABLET ORAL
Qty: 60 | Refills: 0 | Status: ACTIVE | COMMUNITY

## 2022-11-18 RX ORDER — FLUVOXAMINE MALEATE 100 MG/1
100 TABLET, FILM COATED ORAL
Qty: 90 | Refills: 0 | Status: DISCONTINUED | COMMUNITY
Start: 2022-09-13

## 2022-11-18 RX ORDER — SUMATRIPTAN 100 MG/1
100 TABLET, FILM COATED ORAL
Qty: 12 | Refills: 0 | Status: ACTIVE | COMMUNITY
Start: 2022-10-01

## 2022-11-18 RX ORDER — METHOCARBAMOL 750 MG/1
750 TABLET, FILM COATED ORAL
Qty: 20 | Refills: 0 | Status: ACTIVE | COMMUNITY
Start: 2022-09-09

## 2022-11-18 RX ORDER — TOPIRAMATE 50 MG/1
50 TABLET, FILM COATED ORAL
Qty: 180 | Refills: 0 | Status: DISCONTINUED | COMMUNITY
Start: 2022-04-08

## 2022-11-18 NOTE — HISTORY OF PRESENT ILLNESS
[de-identified] : 25 y/o female patient presents today for annual CPE/fasting labs\par - f/u PCOS - following with endocrinology, stable, remains on same dosage of Metformin \par - f/u OCD/ADHD/Learning disorder - compliant with medication, following with Dr. Moran psychiatrist monthly and therapist weekly\par - f/u chronic fatigue syndrome/hx of lyme disease - previously following with [facility] lyme [place] but mother felt they were more neurological and did not help her symptoms \par

## 2022-11-18 NOTE — HEALTH RISK ASSESSMENT
[Never] : Never [No] : In the past 12 months have you used drugs other than those required for medical reasons? No [0] : 2) Feeling down, depressed, or hopeless: Not at all (0) [PHQ-2 Negative - No further assessment needed] : PHQ-2 Negative - No further assessment needed [I have developed a follow-up plan documented below in the note.] : I have developed a follow-up plan documented below in the note. [[id number]] : 0 [Change in mental status noted] : No change in mental status noted [None] : None [With Family] : lives with family [Unemployed] : unemployed [Single] : single [Sexually Active] : not sexually active [Feels Safe at Home] : Feels safe at home [Reports changes in hearing] : Reports no changes in hearing [Reports changes in vision] : Reports no changes in vision [Reports changes in dental health] : Reports no changes in dental health

## 2022-12-03 ENCOUNTER — TRANSCRIPTION ENCOUNTER (OUTPATIENT)
Age: 27
End: 2022-12-03

## 2022-12-30 ENCOUNTER — APPOINTMENT (OUTPATIENT)
Dept: CT IMAGING | Facility: IMAGING CENTER | Age: 27
End: 2022-12-30
Payer: COMMERCIAL

## 2022-12-30 ENCOUNTER — RESULT REVIEW (OUTPATIENT)
Age: 27
End: 2022-12-30

## 2022-12-30 ENCOUNTER — OUTPATIENT (OUTPATIENT)
Dept: OUTPATIENT SERVICES | Facility: HOSPITAL | Age: 27
LOS: 1 days | End: 2022-12-30
Payer: COMMERCIAL

## 2022-12-30 DIAGNOSIS — Z00.8 ENCOUNTER FOR OTHER GENERAL EXAMINATION: ICD-10-CM

## 2022-12-30 PROCEDURE — 74178 CT ABD&PLV WO CNTR FLWD CNTR: CPT

## 2022-12-30 PROCEDURE — 74178 CT ABD&PLV WO CNTR FLWD CNTR: CPT | Mod: 26

## 2023-02-17 LAB
ALBUMIN SERPL ELPH-MCNC: 4.6 G/DL
ALP BLD-CCNC: 88 U/L
ALT SERPL-CCNC: 15 U/L
ANION GAP SERPL CALC-SCNC: 13 MMOL/L
APPEARANCE: ABNORMAL
AST SERPL-CCNC: 14 U/L
BACTERIA: NEGATIVE
BASOPHILS # BLD AUTO: 0.04 K/UL
BASOPHILS NFR BLD AUTO: 0.5 %
BILIRUB SERPL-MCNC: 0.3 MG/DL
BILIRUBIN URINE: NEGATIVE
BLOOD URINE: ABNORMAL
BUN SERPL-MCNC: 19 MG/DL
CALCIUM OXALATE CRYSTALS: ABNORMAL
CALCIUM SERPL-MCNC: 9.9 MG/DL
CHLORIDE SERPL-SCNC: 107 MMOL/L
CHOLEST SERPL-MCNC: 136 MG/DL
CO2 SERPL-SCNC: 22 MMOL/L
COLOR: YELLOW
CREAT SERPL-MCNC: 0.89 MG/DL
EGFR: 93 ML/MIN/1.73M2
EOSINOPHIL # BLD AUTO: 0.11 K/UL
EOSINOPHIL NFR BLD AUTO: 1.5 %
ESTIMATED AVERAGE GLUCOSE: 91 MG/DL
GLUCOSE QUALITATIVE U: NEGATIVE
GLUCOSE SERPL-MCNC: 71 MG/DL
HBA1C MFR BLD HPLC: 4.8 %
HCT VFR BLD CALC: 42.4 %
HDLC SERPL-MCNC: 46 MG/DL
HGB BLD-MCNC: 14.9 G/DL
HYALINE CASTS: 0 /LPF
IMM GRANULOCYTES NFR BLD AUTO: 0.1 %
INSULIN SERPL-MCNC: 3.6 UU/ML
IRON SATN MFR SERPL: 20 %
IRON SERPL-MCNC: 74 UG/DL
KETONES URINE: NEGATIVE
LDLC SERPL CALC-MCNC: 75 MG/DL
LEUKOCYTE ESTERASE URINE: ABNORMAL
LYMPHOCYTES # BLD AUTO: 2.79 K/UL
LYMPHOCYTES NFR BLD AUTO: 37.1 %
MAGNESIUM SERPL-MCNC: 2.1 MG/DL
MAN DIFF?: NORMAL
MCHC RBC-ENTMCNC: 32.5 PG
MCHC RBC-ENTMCNC: 35.1 GM/DL
MCV RBC AUTO: 92.6 FL
MICROSCOPIC-UA: NORMAL
MONOCYTES # BLD AUTO: 0.51 K/UL
MONOCYTES NFR BLD AUTO: 6.8 %
NEUTROPHILS # BLD AUTO: 4.06 K/UL
NEUTROPHILS NFR BLD AUTO: 54 %
NITRITE URINE: NEGATIVE
NONHDLC SERPL-MCNC: 90 MG/DL
PH URINE: 6.5
PLATELET # BLD AUTO: 316 K/UL
POTASSIUM SERPL-SCNC: 4.2 MMOL/L
PROT SERPL-MCNC: 6.8 G/DL
PROTEIN URINE: NORMAL
RBC # BLD: 4.58 M/UL
RBC # FLD: 12.1 %
RED BLOOD CELLS URINE: 16 /HPF
SODIUM SERPL-SCNC: 141 MMOL/L
SPECIFIC GRAVITY URINE: 1.02
SQUAMOUS EPITHELIAL CELLS: 6 /HPF
TIBC SERPL-MCNC: 373 UG/DL
TRIGL SERPL-MCNC: 78 MG/DL
TSH SERPL-ACNC: 2.82 UIU/ML
UIBC SERPL-MCNC: 299 UG/DL
UROBILINOGEN URINE: NORMAL
VIT B12 SERPL-MCNC: 340 PG/ML
WBC # FLD AUTO: 7.52 K/UL
WHITE BLOOD CELLS URINE: 9 /HPF

## 2023-03-28 ENCOUNTER — APPOINTMENT (OUTPATIENT)
Dept: ULTRASOUND IMAGING | Facility: CLINIC | Age: 28
End: 2023-03-28
Payer: COMMERCIAL

## 2023-03-28 PROCEDURE — 76641 ULTRASOUND BREAST COMPLETE: CPT | Mod: 50

## 2023-04-06 ENCOUNTER — APPOINTMENT (OUTPATIENT)
Dept: ULTRASOUND IMAGING | Facility: CLINIC | Age: 28
End: 2023-04-06
Payer: COMMERCIAL

## 2023-04-06 PROCEDURE — 19083 BX BREAST 1ST LESION US IMAG: CPT | Mod: LT

## 2023-04-06 PROCEDURE — A4648: CPT | Mod: 59

## 2023-09-06 ENCOUNTER — EMERGENCY (EMERGENCY)
Facility: HOSPITAL | Age: 28
LOS: 1 days | Discharge: HOME | End: 2023-09-06
Attending: EMERGENCY MEDICINE
Payer: COMMERCIAL

## 2023-09-06 VITALS
HEART RATE: 77 BPM | RESPIRATION RATE: 18 BRPM | DIASTOLIC BLOOD PRESSURE: 88 MMHG | TEMPERATURE: 98 F | OXYGEN SATURATION: 99 % | SYSTOLIC BLOOD PRESSURE: 111 MMHG

## 2023-09-06 VITALS
HEIGHT: 64 IN | HEART RATE: 105 BPM | TEMPERATURE: 98 F | SYSTOLIC BLOOD PRESSURE: 121 MMHG | RESPIRATION RATE: 20 BRPM | OXYGEN SATURATION: 97 % | DIASTOLIC BLOOD PRESSURE: 93 MMHG | WEIGHT: 151.9 LBS

## 2023-09-06 LAB
ALBUMIN SERPL ELPH-MCNC: 4.3 G/DL — SIGNIFICANT CHANGE UP (ref 3.3–5)
ALP SERPL-CCNC: 86 U/L — SIGNIFICANT CHANGE UP (ref 40–120)
ALT FLD-CCNC: 22 U/L — SIGNIFICANT CHANGE UP (ref 10–45)
ANION GAP SERPL CALC-SCNC: 12 MMOL/L — SIGNIFICANT CHANGE UP (ref 5–17)
AST SERPL-CCNC: 15 U/L — SIGNIFICANT CHANGE UP (ref 10–40)
BASE EXCESS BLDV CALC-SCNC: -1.1 MMOL/L — SIGNIFICANT CHANGE UP (ref -2–3)
BASOPHILS # BLD AUTO: 0.03 K/UL — SIGNIFICANT CHANGE UP (ref 0–0.2)
BASOPHILS NFR BLD AUTO: 0.4 % — SIGNIFICANT CHANGE UP (ref 0–2)
BILIRUB SERPL-MCNC: 0.3 MG/DL — SIGNIFICANT CHANGE UP (ref 0.2–1.2)
BUN SERPL-MCNC: 20 MG/DL — SIGNIFICANT CHANGE UP (ref 7–23)
CA-I SERPL-SCNC: 1.22 MMOL/L — SIGNIFICANT CHANGE UP (ref 1.15–1.33)
CALCIUM SERPL-MCNC: 9.4 MG/DL — SIGNIFICANT CHANGE UP (ref 8.4–10.5)
CHLORIDE BLDV-SCNC: 106 MMOL/L — SIGNIFICANT CHANGE UP (ref 96–108)
CHLORIDE SERPL-SCNC: 105 MMOL/L — SIGNIFICANT CHANGE UP (ref 96–108)
CO2 BLDV-SCNC: 26 MMOL/L — SIGNIFICANT CHANGE UP (ref 22–26)
CO2 SERPL-SCNC: 22 MMOL/L — SIGNIFICANT CHANGE UP (ref 22–31)
CREAT SERPL-MCNC: 0.8 MG/DL — SIGNIFICANT CHANGE UP (ref 0.5–1.3)
EGFR: 105 ML/MIN/1.73M2 — SIGNIFICANT CHANGE UP
EOSINOPHIL # BLD AUTO: 0.02 K/UL — SIGNIFICANT CHANGE UP (ref 0–0.5)
EOSINOPHIL NFR BLD AUTO: 0.3 % — SIGNIFICANT CHANGE UP (ref 0–6)
GAS PNL BLDV: 135 MMOL/L — LOW (ref 136–145)
GAS PNL BLDV: SIGNIFICANT CHANGE UP
GAS PNL BLDV: SIGNIFICANT CHANGE UP
GLUCOSE BLDV-MCNC: 91 MG/DL — SIGNIFICANT CHANGE UP (ref 70–99)
GLUCOSE SERPL-MCNC: 94 MG/DL — SIGNIFICANT CHANGE UP (ref 70–99)
HCG SERPL-ACNC: <2 MIU/ML — SIGNIFICANT CHANGE UP
HCO3 BLDV-SCNC: 24 MMOL/L — SIGNIFICANT CHANGE UP (ref 22–29)
HCT VFR BLD CALC: 40.6 % — SIGNIFICANT CHANGE UP (ref 34.5–45)
HCT VFR BLDA CALC: 42 % — SIGNIFICANT CHANGE UP (ref 34.5–46.5)
HGB BLD CALC-MCNC: 14.1 G/DL — SIGNIFICANT CHANGE UP (ref 11.7–16.1)
HGB BLD-MCNC: 13.9 G/DL — SIGNIFICANT CHANGE UP (ref 11.5–15.5)
IMM GRANULOCYTES NFR BLD AUTO: 0.3 % — SIGNIFICANT CHANGE UP (ref 0–0.9)
LACTATE BLDV-MCNC: 1.5 MMOL/L — SIGNIFICANT CHANGE UP (ref 0.5–2)
LYMPHOCYTES # BLD AUTO: 1.64 K/UL — SIGNIFICANT CHANGE UP (ref 1–3.3)
LYMPHOCYTES # BLD AUTO: 22.5 % — SIGNIFICANT CHANGE UP (ref 13–44)
MAGNESIUM SERPL-MCNC: 2 MG/DL — SIGNIFICANT CHANGE UP (ref 1.6–2.6)
MCHC RBC-ENTMCNC: 30 PG — SIGNIFICANT CHANGE UP (ref 27–34)
MCHC RBC-ENTMCNC: 34.2 GM/DL — SIGNIFICANT CHANGE UP (ref 32–36)
MCV RBC AUTO: 87.7 FL — SIGNIFICANT CHANGE UP (ref 80–100)
MONOCYTES # BLD AUTO: 0.45 K/UL — SIGNIFICANT CHANGE UP (ref 0–0.9)
MONOCYTES NFR BLD AUTO: 6.2 % — SIGNIFICANT CHANGE UP (ref 2–14)
NEUTROPHILS # BLD AUTO: 5.12 K/UL — SIGNIFICANT CHANGE UP (ref 1.8–7.4)
NEUTROPHILS NFR BLD AUTO: 70.3 % — SIGNIFICANT CHANGE UP (ref 43–77)
NRBC # BLD: 0 /100 WBCS — SIGNIFICANT CHANGE UP (ref 0–0)
PCO2 BLDV: 42 MMHG — SIGNIFICANT CHANGE UP (ref 39–42)
PH BLDV: 7.37 — SIGNIFICANT CHANGE UP (ref 7.32–7.43)
PHOSPHATE SERPL-MCNC: 1.9 MG/DL — LOW (ref 2.5–4.5)
PLATELET # BLD AUTO: 309 K/UL — SIGNIFICANT CHANGE UP (ref 150–400)
PO2 BLDV: 37 MMHG — SIGNIFICANT CHANGE UP (ref 25–45)
POTASSIUM BLDV-SCNC: 3.8 MMOL/L — SIGNIFICANT CHANGE UP (ref 3.5–5.1)
POTASSIUM SERPL-MCNC: 3.8 MMOL/L — SIGNIFICANT CHANGE UP (ref 3.5–5.3)
POTASSIUM SERPL-SCNC: 3.8 MMOL/L — SIGNIFICANT CHANGE UP (ref 3.5–5.3)
PROT SERPL-MCNC: 7.2 G/DL — SIGNIFICANT CHANGE UP (ref 6–8.3)
RBC # BLD: 4.63 M/UL — SIGNIFICANT CHANGE UP (ref 3.8–5.2)
RBC # FLD: 11.5 % — SIGNIFICANT CHANGE UP (ref 10.3–14.5)
SAO2 % BLDV: 65.5 % — LOW (ref 67–88)
SODIUM SERPL-SCNC: 139 MMOL/L — SIGNIFICANT CHANGE UP (ref 135–145)
WBC # BLD: 7.28 K/UL — SIGNIFICANT CHANGE UP (ref 3.8–10.5)
WBC # FLD AUTO: 7.28 K/UL — SIGNIFICANT CHANGE UP (ref 3.8–10.5)

## 2023-09-06 PROCEDURE — 84100 ASSAY OF PHOSPHORUS: CPT

## 2023-09-06 PROCEDURE — 96374 THER/PROPH/DIAG INJ IV PUSH: CPT

## 2023-09-06 PROCEDURE — 80053 COMPREHEN METABOLIC PANEL: CPT

## 2023-09-06 PROCEDURE — 84295 ASSAY OF SERUM SODIUM: CPT

## 2023-09-06 PROCEDURE — 82435 ASSAY OF BLOOD CHLORIDE: CPT

## 2023-09-06 PROCEDURE — 99284 EMERGENCY DEPT VISIT MOD MDM: CPT | Mod: 25

## 2023-09-06 PROCEDURE — 85014 HEMATOCRIT: CPT

## 2023-09-06 PROCEDURE — 85018 HEMOGLOBIN: CPT

## 2023-09-06 PROCEDURE — 82330 ASSAY OF CALCIUM: CPT

## 2023-09-06 PROCEDURE — 83735 ASSAY OF MAGNESIUM: CPT

## 2023-09-06 PROCEDURE — 83605 ASSAY OF LACTIC ACID: CPT

## 2023-09-06 PROCEDURE — 82947 ASSAY GLUCOSE BLOOD QUANT: CPT

## 2023-09-06 PROCEDURE — 84132 ASSAY OF SERUM POTASSIUM: CPT

## 2023-09-06 PROCEDURE — 82803 BLOOD GASES ANY COMBINATION: CPT

## 2023-09-06 PROCEDURE — 85025 COMPLETE CBC W/AUTO DIFF WBC: CPT

## 2023-09-06 PROCEDURE — 84702 CHORIONIC GONADOTROPIN TEST: CPT

## 2023-09-06 PROCEDURE — 96375 TX/PRO/DX INJ NEW DRUG ADDON: CPT

## 2023-09-06 PROCEDURE — 93005 ELECTROCARDIOGRAM TRACING: CPT

## 2023-09-06 PROCEDURE — 99285 EMERGENCY DEPT VISIT HI MDM: CPT

## 2023-09-06 RX ORDER — DIPHENHYDRAMINE HCL 50 MG
25 CAPSULE ORAL ONCE
Refills: 0 | Status: COMPLETED | OUTPATIENT
Start: 2023-09-06 | End: 2023-09-06

## 2023-09-06 RX ORDER — DIAZEPAM 5 MG
5 TABLET ORAL ONCE
Refills: 0 | Status: DISCONTINUED | OUTPATIENT
Start: 2023-09-06 | End: 2023-09-06

## 2023-09-06 RX ORDER — DIAZEPAM 5 MG
1 TABLET ORAL
Qty: 5 | Refills: 0
Start: 2023-09-06

## 2023-09-06 RX ORDER — METOCLOPRAMIDE HCL 10 MG
10 TABLET ORAL ONCE
Refills: 0 | Status: COMPLETED | OUTPATIENT
Start: 2023-09-06 | End: 2023-09-06

## 2023-09-06 RX ORDER — ACETAMINOPHEN 500 MG
1000 TABLET ORAL ONCE
Refills: 0 | Status: COMPLETED | OUTPATIENT
Start: 2023-09-06 | End: 2023-09-06

## 2023-09-06 RX ORDER — SODIUM,POTASSIUM PHOSPHATES 278-250MG
1 POWDER IN PACKET (EA) ORAL ONCE
Refills: 0 | Status: COMPLETED | OUTPATIENT
Start: 2023-09-06 | End: 2023-09-06

## 2023-09-06 RX ORDER — KETOROLAC TROMETHAMINE 30 MG/ML
15 SYRINGE (ML) INJECTION ONCE
Refills: 0 | Status: DISCONTINUED | OUTPATIENT
Start: 2023-09-06 | End: 2023-09-06

## 2023-09-06 RX ORDER — SODIUM CHLORIDE 9 MG/ML
1000 INJECTION INTRAMUSCULAR; INTRAVENOUS; SUBCUTANEOUS ONCE
Refills: 0 | Status: COMPLETED | OUTPATIENT
Start: 2023-09-06 | End: 2023-09-06

## 2023-09-06 RX ADMIN — Medication 15 MILLIGRAM(S): at 08:16

## 2023-09-06 RX ADMIN — Medication 5 MILLIGRAM(S): at 11:01

## 2023-09-06 RX ADMIN — Medication 10 MILLIGRAM(S): at 08:16

## 2023-09-06 RX ADMIN — Medication 400 MILLIGRAM(S): at 08:29

## 2023-09-06 RX ADMIN — Medication 1 PACKET(S): at 09:34

## 2023-09-06 RX ADMIN — SODIUM CHLORIDE 1000 MILLILITER(S): 9 INJECTION INTRAMUSCULAR; INTRAVENOUS; SUBCUTANEOUS at 08:16

## 2023-09-06 RX ADMIN — Medication 25 MILLIGRAM(S): at 08:16

## 2023-09-06 NOTE — ED PROVIDER NOTE - ATTENDING APP SHARED VISIT CONTRIBUTION OF CARE
attending Kanu: 27yF h/o lyme disease, migraines p/w headache and neck pain, typical of prior migraines. Reports headache n/v x 4 days. Had her Topamax discontinued as it was thought to cause her kidney stones. Received Botox injection last week for migraine. Has sumatriptan at home which she is to take PRN. Exam as above. Will obtain labs, treat symptomatically for migraine, reassess

## 2023-09-06 NOTE — ED ADULT NURSE NOTE - OBJECTIVE STATEMENT
27y f pt c/o headache, neck pain, nausea, vomiting since tuesday; pt had botex injections in head on tuesday for migraines; pt also taken off her regular med topamax due to chronic kidney stones; pt last vomiting episode 10 mins ago; last episode of diarrhea last night; decreased po due to nausea; 27y f pt c/o headache, neck pain, nausea, vomiting since tuesday; pt had botex injections in head on tuesday for migraines; pt also taken off her regular med topamax due to chronic kidney stones; pt last vomiting episode 10 mins ago; last episode of diarrhea last night; decreased po due to nausea; aox3; no resp distress; no sob; some chest burning, pt indicates sternal area; no abd pain; no dysuria/hematuria; no blood in stool; + chills no known fevers; no numbness/tingling; no diff swallow/breath; iv placed; labs drawn/sent; pt medicated per order; mother at bedside

## 2023-09-06 NOTE — ED ADULT NURSE NOTE - NSICDXPASTMEDICALHX_GEN_ALL_CORE_FT
PAST MEDICAL HISTORY:  Anxiety     Attention deficit disorder     Kidney stones     OCD (obsessive compulsive disorder)     PCOS (polycystic ovarian syndrome)

## 2023-09-06 NOTE — ED PROVIDER NOTE - PHYSICAL EXAMINATION
CONSTITUTIONAL: Patient is awake, alert and oriented x 3. Patient is well appearing;   HEAD: NCAT  EYES: PERRL bilaterally, EOMI,   ENT: Airway patent, Nasal mucosa clear. dry oral mucosa;   NECK: Supple, No LAD,  LUNGS: CTA B/L,   HEART: RRR.+S1S2   ABDOMEN: Soft, non-tender to palpation throughout all four quadrants,    MSK: FROM upper and lower ext b/l,   SKIN: No rash or lesions  NEURO:  No focal deficits, Strength5/5 UE and LE b/l; Sensation intact; CONSTITUTIONAL: Patient is awake, alert and oriented x 3. Patient is well appearing;   HEAD: NCAT  EYES: PERRL bilaterally, EOMI,   ENT: Airway patent, Nasal mucosa clear. dry oral mucosa;   NECK: Supple, No LAD,  LUNGS: CTA B/L,   HEART: RRR.+S1S2   ABDOMEN: Soft, non-tender to palpation throughout all four quadrants,    MSK: FROM upper and lower ext b/l, no midline cervical,thoracic or lumbar ttp, FROM of cervical spine without pain, (+) right paracervical ttp;   SKIN: No rash or lesions  NEURO:  No focal deficits, Strength5/5 UE and LE b/l; Sensation intact;

## 2023-09-06 NOTE — ED PROVIDER NOTE - OBJECTIVE STATEMENT
26 y/o female with PMHx of lyme disease, migraines presents to the ED complaining of headache n/v x 4 days. Patient states that 8 days ago her neurologist Dr. Weinberg stopped her Topamax because it was causing her to get frequent kidney stones. She was previously well controlled on this medication. Has never been to the Ed for migraine before. She states that over the past week headache has progressively worsened. Over the weekend she has had multiple episodes of nausea and vomiting. She is unable to tolerate PO. She feels very dehydrated. Called her neurologist and was instructed to take sumitriptan. This did not help. Patient received botox injection last week for her migraines. Today has 10/10 headache to left side of face which radiates down the neck. Denies any falls. No fevers, chills, chest pain, cough, abdominal pain, diarrhea, dysuria.

## 2023-09-06 NOTE — ED PROVIDER NOTE - PATIENT PORTAL LINK FT
You can access the FollowMyHealth Patient Portal offered by [facility] by registering at the following website: [email or web address]. By joining [employer]’s FollowMyHealth portal, you will also be able to view your health information using other applications (apps) compatible with our system.

## 2023-09-06 NOTE — ED ADULT TRIAGE NOTE - CHIEF COMPLAINT QUOTE
headache, neck pain and vomiting x2 days  Hx migraines  stopped taking Topamax on 8/28, had Botox injections the same day

## 2023-09-06 NOTE — ED PROVIDER NOTE - NSFOLLOWUPINSTRUCTIONS_ED_ALL_ED_FT
1. Follow up with your PCP within 2-3 days.   2. Go to your scheduled neurology vian[facility] on friday with dr. Brown.   3. Rest. Hydrate. Take Valium 5 mg as needed for muscle spasm.   4. Return to the emergency department if you have worsening headache, dizziness, nausea, fevers or all other concerning symptoms.

## 2023-09-06 NOTE — ED PROVIDER NOTE - PROGRESS NOTE DETAILS
Symptoms improved s/p meds. Pt has vianey with neurologist friday. Will dc with PO vaium as needed for neck pain. Geraldine Cuevas PA-C

## 2023-10-24 ENCOUNTER — APPOINTMENT (OUTPATIENT)
Dept: RADIOLOGY | Facility: CLINIC | Age: 28
End: 2023-10-24
Payer: COMMERCIAL

## 2023-10-24 ENCOUNTER — RESULT REVIEW (OUTPATIENT)
Age: 28
End: 2023-10-24

## 2023-10-24 ENCOUNTER — APPOINTMENT (OUTPATIENT)
Dept: ULTRASOUND IMAGING | Facility: CLINIC | Age: 28
End: 2023-10-24
Payer: COMMERCIAL

## 2023-10-24 PROCEDURE — 74018 RADEX ABDOMEN 1 VIEW: CPT

## 2023-10-24 PROCEDURE — 76770 US EXAM ABDO BACK WALL COMP: CPT

## 2023-11-28 ENCOUNTER — APPOINTMENT (OUTPATIENT)
Dept: PAIN MANAGEMENT | Facility: CLINIC | Age: 28
End: 2023-11-28

## 2023-12-08 ENCOUNTER — LABORATORY RESULT (OUTPATIENT)
Age: 28
End: 2023-12-08

## 2023-12-08 ENCOUNTER — APPOINTMENT (OUTPATIENT)
Dept: INTERNAL MEDICINE | Facility: CLINIC | Age: 28
End: 2023-12-08
Payer: COMMERCIAL

## 2023-12-08 VITALS
TEMPERATURE: 98.5 F | BODY MASS INDEX: 27.31 KG/M2 | HEIGHT: 64.37 IN | HEART RATE: 88 BPM | DIASTOLIC BLOOD PRESSURE: 72 MMHG | WEIGHT: 160 LBS | SYSTOLIC BLOOD PRESSURE: 107 MMHG | RESPIRATION RATE: 16 BRPM | OXYGEN SATURATION: 97 %

## 2023-12-08 DIAGNOSIS — Z00.00 ENCOUNTER FOR GENERAL ADULT MEDICAL EXAMINATION W/OUT ABNORMAL FINDINGS: ICD-10-CM

## 2023-12-08 PROCEDURE — 36415 COLL VENOUS BLD VENIPUNCTURE: CPT

## 2023-12-08 PROCEDURE — 99395 PREV VISIT EST AGE 18-39: CPT | Mod: 25

## 2023-12-08 RX ORDER — PROPRANOLOL HYDROCHLORIDE 60 MG/1
60 TABLET ORAL 3 TIMES DAILY
Refills: 0 | Status: ACTIVE | COMMUNITY
Start: 2023-12-08

## 2023-12-08 RX ORDER — TOPIRAMATE 100 MG/1
100 TABLET, COATED ORAL TWICE DAILY
Refills: 0 | Status: DISCONTINUED | COMMUNITY
End: 2023-12-08

## 2023-12-12 LAB
25(OH)D3 SERPL-MCNC: 36.6 NG/ML
ALBUMIN SERPL ELPH-MCNC: 4.3 G/DL
ALP BLD-CCNC: 107 U/L
ALT SERPL-CCNC: 27 U/L
ANA SER IF-ACNC: NEGATIVE
ANION GAP SERPL CALC-SCNC: 14 MMOL/L
APPEARANCE: CLEAR
AST SERPL-CCNC: 14 U/L
BASOPHILS # BLD AUTO: 0.04 K/UL
BASOPHILS NFR BLD AUTO: 0.4 %
BILIRUB SERPL-MCNC: 0.3 MG/DL
BILIRUBIN URINE: ABNORMAL
BLOOD URINE: ABNORMAL
BUN SERPL-MCNC: 19 MG/DL
CALCIUM SERPL-MCNC: 9.6 MG/DL
CHLORIDE SERPL-SCNC: 103 MMOL/L
CHOLEST SERPL-MCNC: 220 MG/DL
CO2 SERPL-SCNC: 20 MMOL/L
COLOR: ABNORMAL
CREAT SERPL-MCNC: 0.74 MG/DL
EGFR: 115 ML/MIN/1.73M2
EOSINOPHIL # BLD AUTO: 0.14 K/UL
EOSINOPHIL NFR BLD AUTO: 1.4 %
ESTIMATED AVERAGE GLUCOSE: 97 MG/DL
FOLATE SERPL-MCNC: 6.2 NG/ML
GLUCOSE QUALITATIVE U: ABNORMAL
GLUCOSE SERPL-MCNC: 72 MG/DL
HBA1C MFR BLD HPLC: 5 %
HCT VFR BLD CALC: 39.9 %
HDLC SERPL-MCNC: 46 MG/DL
HGB BLD-MCNC: 13.4 G/DL
IMM GRANULOCYTES NFR BLD AUTO: 0.1 %
INSULIN SERPL-MCNC: 9 UU/ML
KETONES URINE: NORMAL
LDLC SERPL CALC-MCNC: 141 MG/DL
LEUKOCYTE ESTERASE URINE: ABNORMAL
LYMPHOCYTES # BLD AUTO: 3.39 K/UL
LYMPHOCYTES NFR BLD AUTO: 34.6 %
MAGNESIUM SERPL-MCNC: 1.9 MG/DL
MAN DIFF?: NORMAL
MCHC RBC-ENTMCNC: 30.1 PG
MCHC RBC-ENTMCNC: 33.6 GM/DL
MCV RBC AUTO: 89.7 FL
MONOCYTES # BLD AUTO: 0.6 K/UL
MONOCYTES NFR BLD AUTO: 6.1 %
NEUTROPHILS # BLD AUTO: 5.63 K/UL
NEUTROPHILS NFR BLD AUTO: 57.4 %
NITRITE URINE: POSITIVE
NONHDLC SERPL-MCNC: 174 MG/DL
PH URINE: 6
PLATELET # BLD AUTO: 417 K/UL
POTASSIUM SERPL-SCNC: 4.3 MMOL/L
PROT SERPL-MCNC: 6.7 G/DL
PROTEIN URINE: ABNORMAL
RBC # BLD: 4.45 M/UL
RBC # FLD: 11.9 %
SODIUM SERPL-SCNC: 137 MMOL/L
SPECIFIC GRAVITY URINE: >=1.03
TRIGL SERPL-MCNC: 184 MG/DL
TSH SERPL-ACNC: 3.35 UIU/ML
UROBILINOGEN URINE: NORMAL
VIT B12 SERPL-MCNC: 184 PG/ML
WBC # FLD AUTO: 9.81 K/UL

## 2023-12-14 DIAGNOSIS — R31.9 HEMATURIA, UNSPECIFIED: ICD-10-CM

## 2023-12-18 DIAGNOSIS — N39.0 URINARY TRACT INFECTION, SITE NOT SPECIFIED: ICD-10-CM

## 2023-12-18 RX ORDER — SULFAMETHOXAZOLE AND TRIMETHOPRIM 800; 160 MG/1; MG/1
800-160 TABLET ORAL
Qty: 10 | Refills: 0 | Status: ACTIVE | COMMUNITY
Start: 2023-12-18 | End: 1900-01-01

## 2024-01-27 ENCOUNTER — NON-APPOINTMENT (OUTPATIENT)
Age: 29
End: 2024-01-27

## 2024-01-30 ENCOUNTER — APPOINTMENT (OUTPATIENT)
Dept: PAIN MANAGEMENT | Facility: CLINIC | Age: 29
End: 2024-01-30
Payer: COMMERCIAL

## 2024-01-30 VITALS
HEIGHT: 64 IN | BODY MASS INDEX: 29.02 KG/M2 | WEIGHT: 170 LBS | DIASTOLIC BLOOD PRESSURE: 85 MMHG | SYSTOLIC BLOOD PRESSURE: 115 MMHG | HEART RATE: 86 BPM

## 2024-01-30 PROCEDURE — 99205 OFFICE O/P NEW HI 60 MIN: CPT

## 2024-03-29 ENCOUNTER — APPOINTMENT (OUTPATIENT)
Dept: PAIN MANAGEMENT | Facility: CLINIC | Age: 29
End: 2024-03-29

## 2024-04-22 RX ORDER — PROPRANOLOL HYDROCHLORIDE 60 MG/1
60 CAPSULE, EXTENDED RELEASE ORAL
Qty: 30 | Refills: 2 | Status: ACTIVE | COMMUNITY
Start: 2024-04-22 | End: 1900-01-01

## 2024-05-22 ENCOUNTER — APPOINTMENT (OUTPATIENT)
Dept: PAIN MANAGEMENT | Facility: CLINIC | Age: 29
End: 2024-05-22
Payer: COMMERCIAL

## 2024-05-22 VITALS
WEIGHT: 187 LBS | BODY MASS INDEX: 31.92 KG/M2 | HEART RATE: 90 BPM | SYSTOLIC BLOOD PRESSURE: 122 MMHG | HEIGHT: 64 IN | DIASTOLIC BLOOD PRESSURE: 87 MMHG

## 2024-05-22 PROCEDURE — 64615 CHEMODENERV MUSC MIGRAINE: CPT

## 2024-05-22 PROCEDURE — 99213 OFFICE O/P EST LOW 20 MIN: CPT | Mod: 25

## 2024-05-22 NOTE — PHYSICAL EXAM
[FreeTextEntry1] : Constitutional: No signs of distress or signs of toxicity. Mental Status: Alert and well oriented. Speech fluent. No aphasia. Fund of knowledge intact. Psychiatric: Mood stable Cranial Nerve: PERRLA: No papilledema; No VFC; EOM full. no nystagmus. No Rohini. V1-3 intact. No facial asymmetry, hearing grossly intact; palate elevates symmetrically, tongue midline Motor: No involuntary movements noted.  Adequate bulk, tone throughout, 5/5 strength of all muscle groups DTR: present and symmetrical; no clonus, plantars  downgoing Sensory: intact to primary and secondary modalities; neg Romberg Cerebellar: adequate finger to nose and heel to shin bilaterally. Gait: non antalgic or ataxic. Eyes: no redness or swelling HEENT: intact; no signs of trauma. Neck: No masses noted Pulmonary: no respiratory distress Vascular: no temperature, color change or sudomotor changes.; no edema Musculoskeletal: examination of the cervical spine reveals no midline tenderness, range of motion full upon flexion, extension and lateral rotation. Negative facet tenderness, Negative Spurlings bilaterally. examination of the lumbar spine reveals no midline or paraspinal tenderness; Range of motion full upon flexion, extension and lateral rotation; negative facet loading, No tenderness of sciatic notch, No tenderness of bilateral greater trochanters, Negative ARCHIE, negative SLRT bilaterally, Skin: No rash.

## 2024-05-22 NOTE — PROCEDURE
[FreeTextEntry1] : Consent signed. Risks and benefits were discussed. Alternative options were also discussed. Patient was in seated position. Skin was prepped and alcohol was placed on area of injection. Cervical paraspinal injection was done using a 27 and 1.25 inch needle using 1%Lidocaine for a total of 5cc. Patient observed for 5 mins following procedure. No complications.

## 2024-05-22 NOTE — HISTORY OF PRESENT ILLNESS
[FreeTextEntry1] : This is a case of a 28-year-old left-handed female who was accompanied by her mother.  The patient reports a history of chronic Lyme disease since age 14, migraines, neck spasms, hyperinsulinemia ,PCOS, recurrent kidney stones who presents with headache and neck pain.  Mom states that the patient is on the spectrum. The patient describes her headaches as a throbbing sensation primarily along the left side of her head and neck region.  These headaches are associated with nausea, vomiting, photophobia, and phonophobia.  She denies aura.  According to the patient these headaches began many years ago leaving her in bed most of the day playing on [employer].  She is tried Topamax, Inderal, topical Voltaren, muscle relaxant, acupuncture, Botox x 1 all without benefit.  She has never been on Nurtec or Ubrelvy or any of the monoclonal antibodies.  She is currently on fluvoxamine, sumatriptan up to 11 tablets/month.  She also is taking over-the-counter agents up to 6 days per week She used to see a therapist but is currently not involved in any treatment at this time.  Her sleep habits are regular.  She has high school education.  Caffeine intake/day  1 cup Triggers: None Comorbidities: OCD Imaging: MRI of the brain performed years ago.  Reported no structural abnormality. SHX: Attempted to work for a few days following high school but has not maintained any job to date.  She denies alcohol tobacco or cannabis use.  Work status: ETOH, tobacco cannabis PMH: PSH: Family history: Noncontributory Allergies: NKA

## 2024-05-22 NOTE — ASSESSMENT
[FreeTextEntry1] : This is a case of a 28-year-old woman with past medical history of OCD, migraines, neck pain, autism, kidney stones who presents with chronic migraine.  In addition, she suffers from analgesic rebound headaches.  The patient is significantly dysfunctional with her headaches and neck pain.  I am recommending a trial of Botox and she has failed on beta-blocker and Topamax.  She had received 1 treatment of Botox in November with benefit.  However, it is recommended for a trial of at least 3 cycles of Botox before determining its benefit.  I have discussed extensively the potential causes of her migraine headaches emphasizing the importance of lifestyle modifications including diet, sleep hygiene, exercise, hydration, avoidance of potential triggers,and caffeine, I also discussed the role of non pharmacological interventions including herbal, vitamin and mineral supplementation, mindfulness meditation, breathing exercises, therapeutic massage, acupuncture and CBT.  The patient had ample opportunity to ask questions and to provide feedback.  All questions were answered and patient understood the goals of our treatment plan.

## 2024-05-30 NOTE — REVIEW OF SYSTEMS
[As Noted in HPI] : as noted in HPI [Negative] : Heme/Lymph Home Suture Removal Text: Patient was provided a home suture removal kit and will remove their sutures at home.  If they have any questions or difficulties they will call the office.

## 2024-06-09 NOTE — HISTORY OF PRESENT ILLNESS
[FreeTextEntry1] : Patient doing well with magnesium, proprnolol and local trigger point injections. Here for BOTOX. No new medical problem.s

## 2024-07-29 ENCOUNTER — APPOINTMENT (OUTPATIENT)
Dept: PAIN MANAGEMENT | Facility: CLINIC | Age: 29
End: 2024-07-29

## 2024-08-02 ENCOUNTER — APPOINTMENT (OUTPATIENT)
Dept: ULTRASOUND IMAGING | Facility: CLINIC | Age: 29
End: 2024-08-02
Payer: COMMERCIAL

## 2024-08-02 PROCEDURE — 76641 ULTRASOUND BREAST COMPLETE: CPT | Mod: 50

## 2024-08-28 NOTE — ED ADULT NURSE NOTE - NURSING GU BLADDER
Patient: Tolu Lyles  YOB: 1966  Date: 8/28/2024 Time: 9:48 AM  Location: [facility]    Leaving the Hospital Against Medical Advice    Chart #:[id number]    This will certify that I, the undersigned,    ______________________________________________________________________    A patient in the above named medical center, having requested discharge and removal from the medical center against the advice of my attending physician(s), hereby release [facility], its physicians, officers and employees, severally and individually, from any and all liability of any nature whatsoever for any injury or harm or complication of any kind that may result directly or indirectly, by reason of my terminating my stay as a patient at [facility] and my departure from [facility], and hereby waive any and all rights of action I may now have or later acquire as a result of my voluntary departure from [facility] and the termination of my stay as a patient therein.    This release is made with the full knowledge of the danger that may result from the action which I am taking.      Date:_______________________                         ___________________________                                                                                    Patient/Legal Representative    Witness:        ____________________________                          ___________________________  Nurse                                                                        Physician     non-distended/non-tender

## 2025-01-17 ENCOUNTER — APPOINTMENT (OUTPATIENT)
Dept: INTERNAL MEDICINE | Facility: CLINIC | Age: 30
End: 2025-01-17
Payer: COMMERCIAL

## 2025-01-17 ENCOUNTER — NON-APPOINTMENT (OUTPATIENT)
Age: 30
End: 2025-01-17

## 2025-01-17 VITALS
HEIGHT: 64 IN | RESPIRATION RATE: 16 BRPM | TEMPERATURE: 99 F | OXYGEN SATURATION: 97 % | WEIGHT: 176 LBS | DIASTOLIC BLOOD PRESSURE: 83 MMHG | SYSTOLIC BLOOD PRESSURE: 112 MMHG | BODY MASS INDEX: 30.05 KG/M2 | HEART RATE: 81 BPM

## 2025-01-17 DIAGNOSIS — E53.8 DEFICIENCY OF OTHER SPECIFIED B GROUP VITAMINS: ICD-10-CM

## 2025-01-17 DIAGNOSIS — Z00.00 ENCOUNTER FOR GENERAL ADULT MEDICAL EXAMINATION W/OUT ABNORMAL FINDINGS: ICD-10-CM

## 2025-01-17 DIAGNOSIS — E28.2 POLYCYSTIC OVARIAN SYNDROME: ICD-10-CM

## 2025-01-17 DIAGNOSIS — E78.2 MIXED HYPERLIPIDEMIA: ICD-10-CM

## 2025-01-17 DIAGNOSIS — F42.9 OBSESSIVE-COMPULSIVE DISORDER, UNSPECIFIED: ICD-10-CM

## 2025-01-17 DIAGNOSIS — E66.811 OBESITY, CLASS 1: ICD-10-CM

## 2025-01-17 DIAGNOSIS — F90.9 ATTENTION-DEFICIT HYPERACTIVITY DISORDER, UNSPECIFIED TYPE: ICD-10-CM

## 2025-01-17 DIAGNOSIS — E88.819 INSULIN RESISTANCE, UNSPECIFIED: ICD-10-CM

## 2025-01-17 LAB
25(OH)D3 SERPL-MCNC: 54.4 NG/ML
ALBUMIN SERPL ELPH-MCNC: 4.3 G/DL
ALP BLD-CCNC: 98 U/L
ALT SERPL-CCNC: 104 U/L
ANION GAP SERPL CALC-SCNC: 15 MMOL/L
AST SERPL-CCNC: 42 U/L
BASOPHILS # BLD AUTO: 0.04 K/UL
BASOPHILS NFR BLD AUTO: 0.4 %
BILIRUB SERPL-MCNC: 0.4 MG/DL
BUN SERPL-MCNC: 12 MG/DL
CALCIUM SERPL-MCNC: 9.4 MG/DL
CHLORIDE SERPL-SCNC: 104 MMOL/L
CHOLEST SERPL-MCNC: 170 MG/DL
CO2 SERPL-SCNC: 19 MMOL/L
CREAT SERPL-MCNC: 0.73 MG/DL
EGFR: 116 ML/MIN/1.73M2
EOSINOPHIL # BLD AUTO: 0.23 K/UL
EOSINOPHIL NFR BLD AUTO: 2.1 %
ESTIMATED AVERAGE GLUCOSE: 97 MG/DL
GLUCOSE SERPL-MCNC: 82 MG/DL
HBA1C MFR BLD HPLC: 5 %
HCT VFR BLD CALC: 41.2 %
HDLC SERPL-MCNC: 40 MG/DL
HGB BLD-MCNC: 14.1 G/DL
IMM GRANULOCYTES NFR BLD AUTO: 0.2 %
LDLC SERPL CALC-MCNC: 95 MG/DL
LYMPHOCYTES # BLD AUTO: 3.88 K/UL
LYMPHOCYTES NFR BLD AUTO: 35.9 %
MAN DIFF?: NORMAL
MCHC RBC-ENTMCNC: 30.2 PG
MCHC RBC-ENTMCNC: 34.2 G/DL
MCV RBC AUTO: 88.2 FL
MONOCYTES # BLD AUTO: 0.75 K/UL
MONOCYTES NFR BLD AUTO: 6.9 %
NEUTROPHILS # BLD AUTO: 5.89 K/UL
NEUTROPHILS NFR BLD AUTO: 54.5 %
NONHDLC SERPL-MCNC: 130 MG/DL
PLATELET # BLD AUTO: 411 K/UL
POTASSIUM SERPL-SCNC: 4 MMOL/L
PROT SERPL-MCNC: 6.6 G/DL
RBC # BLD: 4.67 M/UL
RBC # FLD: 12.9 %
SODIUM SERPL-SCNC: 138 MMOL/L
TRIGL SERPL-MCNC: 201 MG/DL
TSH SERPL-ACNC: 3.82 UIU/ML
VIT B12 SERPL-MCNC: 333 PG/ML
WBC # FLD AUTO: 10.81 K/UL

## 2025-01-17 PROCEDURE — 36415 COLL VENOUS BLD VENIPUNCTURE: CPT

## 2025-01-17 PROCEDURE — 99395 PREV VISIT EST AGE 18-39: CPT

## 2025-01-17 RX ORDER — NORETHINDRONE ACETATE AND ETHINYL ESTRADIOL AND FERROUS FUMARATE 1MG-20(24)
1-20 KIT ORAL
Refills: 0 | Status: ACTIVE | COMMUNITY

## 2025-01-17 RX ORDER — TIRZEPATIDE 2.5 MG/.5ML
2.5 INJECTION, SOLUTION SUBCUTANEOUS
Refills: 0 | Status: ACTIVE | COMMUNITY

## 2025-01-17 RX ORDER — FLUVOXAMINE MALEATE 150 MG/1
150 CAPSULE, EXTENDED RELEASE ORAL
Refills: 0 | Status: ACTIVE | COMMUNITY

## 2025-01-18 LAB — INSULIN SERPL-MCNC: 15 UU/ML

## 2025-01-28 DIAGNOSIS — R31.9 HEMATURIA, UNSPECIFIED: ICD-10-CM

## 2025-01-31 ENCOUNTER — EMERGENCY (EMERGENCY)
Facility: HOSPITAL | Age: 30
LOS: 1 days | Discharge: HOME | End: 2025-01-31
Attending: EMERGENCY MEDICINE
Payer: COMMERCIAL

## 2025-01-31 VITALS
WEIGHT: 169.98 LBS | DIASTOLIC BLOOD PRESSURE: 92 MMHG | HEART RATE: 83 BPM | TEMPERATURE: 98 F | HEIGHT: 64 IN | OXYGEN SATURATION: 98 % | SYSTOLIC BLOOD PRESSURE: 126 MMHG | RESPIRATION RATE: 18 BRPM

## 2025-01-31 VITALS
RESPIRATION RATE: 18 BRPM | TEMPERATURE: 98 F | HEART RATE: 79 BPM | SYSTOLIC BLOOD PRESSURE: 128 MMHG | OXYGEN SATURATION: 98 % | DIASTOLIC BLOOD PRESSURE: 89 MMHG

## 2025-01-31 DIAGNOSIS — R30.0 DYSURIA: ICD-10-CM

## 2025-01-31 LAB
ALBUMIN SERPL ELPH-MCNC: 4.6 G/DL — SIGNIFICANT CHANGE UP (ref 3.3–5)
ALP SERPL-CCNC: 118 U/L — SIGNIFICANT CHANGE UP (ref 40–120)
ALT FLD-CCNC: 212 U/L — HIGH (ref 10–45)
ANION GAP SERPL CALC-SCNC: 17 MMOL/L — SIGNIFICANT CHANGE UP (ref 5–17)
AST SERPL-CCNC: 86 U/L — HIGH (ref 10–40)
BASOPHILS # BLD AUTO: 0.03 K/UL — SIGNIFICANT CHANGE UP (ref 0–0.2)
BASOPHILS NFR BLD AUTO: 0.3 % — SIGNIFICANT CHANGE UP (ref 0–2)
BILIRUB SERPL-MCNC: 0.5 MG/DL — SIGNIFICANT CHANGE UP (ref 0.2–1.2)
BUN SERPL-MCNC: 11 MG/DL — SIGNIFICANT CHANGE UP (ref 7–23)
CALCIUM SERPL-MCNC: 10.2 MG/DL — SIGNIFICANT CHANGE UP (ref 8.4–10.5)
CHLORIDE SERPL-SCNC: 104 MMOL/L — SIGNIFICANT CHANGE UP (ref 96–108)
CO2 SERPL-SCNC: 20 MMOL/L — LOW (ref 22–31)
CREAT SERPL-MCNC: 0.81 MG/DL — SIGNIFICANT CHANGE UP (ref 0.5–1.3)
EGFR: 102 ML/MIN/1.73M2 — SIGNIFICANT CHANGE UP
EOSINOPHIL # BLD AUTO: 0.07 K/UL — SIGNIFICANT CHANGE UP (ref 0–0.5)
EOSINOPHIL NFR BLD AUTO: 0.7 % — SIGNIFICANT CHANGE UP (ref 0–6)
GLUCOSE SERPL-MCNC: 92 MG/DL — SIGNIFICANT CHANGE UP (ref 70–99)
HCG SERPL-ACNC: <2 MIU/ML — SIGNIFICANT CHANGE UP
HCT VFR BLD CALC: 42.7 % — SIGNIFICANT CHANGE UP (ref 34.5–45)
HGB BLD-MCNC: 14.7 G/DL — SIGNIFICANT CHANGE UP (ref 11.5–15.5)
IMM GRANULOCYTES NFR BLD AUTO: 0.3 % — SIGNIFICANT CHANGE UP (ref 0–0.9)
LIDOCAIN IGE QN: 27 U/L — SIGNIFICANT CHANGE UP (ref 7–60)
LYMPHOCYTES # BLD AUTO: 2.62 K/UL — SIGNIFICANT CHANGE UP (ref 1–3.3)
LYMPHOCYTES # BLD AUTO: 25.9 % — SIGNIFICANT CHANGE UP (ref 13–44)
MAGNESIUM SERPL-MCNC: 1.8 MG/DL — SIGNIFICANT CHANGE UP (ref 1.6–2.6)
MCHC RBC-ENTMCNC: 29.9 PG — SIGNIFICANT CHANGE UP (ref 27–34)
MCHC RBC-ENTMCNC: 34.4 G/DL — SIGNIFICANT CHANGE UP (ref 32–36)
MCV RBC AUTO: 86.8 FL — SIGNIFICANT CHANGE UP (ref 80–100)
MONOCYTES # BLD AUTO: 0.74 K/UL — SIGNIFICANT CHANGE UP (ref 0–0.9)
MONOCYTES NFR BLD AUTO: 7.3 % — SIGNIFICANT CHANGE UP (ref 2–14)
NEUTROPHILS # BLD AUTO: 6.63 K/UL — SIGNIFICANT CHANGE UP (ref 1.8–7.4)
NEUTROPHILS NFR BLD AUTO: 65.5 % — SIGNIFICANT CHANGE UP (ref 43–77)
NRBC # BLD: 0 /100 WBCS — SIGNIFICANT CHANGE UP (ref 0–0)
NRBC BLD-RTO: 0 /100 WBCS — SIGNIFICANT CHANGE UP (ref 0–0)
PLATELET # BLD AUTO: 378 K/UL — SIGNIFICANT CHANGE UP (ref 150–400)
POTASSIUM SERPL-MCNC: 3.5 MMOL/L — SIGNIFICANT CHANGE UP (ref 3.5–5.3)
POTASSIUM SERPL-SCNC: 3.5 MMOL/L — SIGNIFICANT CHANGE UP (ref 3.5–5.3)
PROT SERPL-MCNC: 7.4 G/DL — SIGNIFICANT CHANGE UP (ref 6–8.3)
RBC # BLD: 4.92 M/UL — SIGNIFICANT CHANGE UP (ref 3.8–5.2)
RBC # FLD: 12.1 % — SIGNIFICANT CHANGE UP (ref 10.3–14.5)
SODIUM SERPL-SCNC: 141 MMOL/L — SIGNIFICANT CHANGE UP (ref 135–145)
WBC # BLD: 10.12 K/UL — SIGNIFICANT CHANGE UP (ref 3.8–10.5)
WBC # FLD AUTO: 10.12 K/UL — SIGNIFICANT CHANGE UP (ref 3.8–10.5)

## 2025-01-31 PROCEDURE — 84702 CHORIONIC GONADOTROPIN TEST: CPT

## 2025-01-31 PROCEDURE — 76700 US EXAM ABDOM COMPLETE: CPT | Mod: 26

## 2025-01-31 PROCEDURE — 99284 EMERGENCY DEPT VISIT MOD MDM: CPT

## 2025-01-31 PROCEDURE — 96374 THER/PROPH/DIAG INJ IV PUSH: CPT

## 2025-01-31 PROCEDURE — 76700 US EXAM ABDOM COMPLETE: CPT

## 2025-01-31 PROCEDURE — 96375 TX/PRO/DX INJ NEW DRUG ADDON: CPT

## 2025-01-31 PROCEDURE — 99284 EMERGENCY DEPT VISIT MOD MDM: CPT | Mod: 25

## 2025-01-31 PROCEDURE — 83735 ASSAY OF MAGNESIUM: CPT

## 2025-01-31 PROCEDURE — 85025 COMPLETE CBC W/AUTO DIFF WBC: CPT

## 2025-01-31 PROCEDURE — 83690 ASSAY OF LIPASE: CPT

## 2025-01-31 PROCEDURE — 80053 COMPREHEN METABOLIC PANEL: CPT

## 2025-01-31 RX ORDER — FAMOTIDINE 10 MG/ML
20 INJECTION INTRAVENOUS ONCE
Refills: 0 | Status: COMPLETED | OUTPATIENT
Start: 2025-01-31 | End: 2025-01-31

## 2025-01-31 RX ORDER — ONDANSETRON 4 MG/1
4 TABLET, ORALLY DISINTEGRATING ORAL ONCE
Refills: 0 | Status: COMPLETED | OUTPATIENT
Start: 2025-01-31 | End: 2025-01-31

## 2025-01-31 RX ORDER — BACTERIOSTATIC SODIUM CHLORIDE 0.9 %
1000 VIAL (ML) INJECTION ONCE
Refills: 0 | Status: COMPLETED | OUTPATIENT
Start: 2025-01-31 | End: 2025-01-31

## 2025-01-31 RX ORDER — KETOROLAC TROMETHAMINE 10 MG
15 TABLET ORAL ONCE
Refills: 0 | Status: DISCONTINUED | OUTPATIENT
Start: 2025-01-31 | End: 2025-01-31

## 2025-01-31 RX ORDER — ONDANSETRON 4 MG/1
1 TABLET, ORALLY DISINTEGRATING ORAL
Qty: 6 | Refills: 0
Start: 2025-01-31 | End: 2025-02-02

## 2025-01-31 RX ADMIN — Medication 1000 MILLILITER(S): at 13:37

## 2025-01-31 RX ADMIN — FAMOTIDINE 20 MILLIGRAM(S): 10 INJECTION INTRAVENOUS at 13:39

## 2025-01-31 RX ADMIN — Medication 15 MILLIGRAM(S): at 14:35

## 2025-01-31 RX ADMIN — ONDANSETRON 4 MILLIGRAM(S): 4 TABLET, ORALLY DISINTEGRATING ORAL at 13:39

## 2025-01-31 NOTE — ED PROVIDER NOTE - PATIENT PORTAL LINK FT
You can access the FollowMyHealth Patient Portal offered by [facility] by registering at the following website: [email or web address]. By joining [employer]’s FollowMyHealth portal, you will also be able to view your health information using other applications (apps) compatible with our system.

## 2025-01-31 NOTE — ED PROVIDER NOTE - CLINICAL SUMMARY MEDICAL DECISION MAKING FREE TEXT BOX
29-year-old female past medical history of PCOS, hyperinsulinemia, chronic Lyme disease presents to the ED for evaluation of nausea/vomiting for the past 2 days.  Patient states she is on a ZEPBOUND shots and has nausea with it at baseline. Past 2 days has had multiple episodes of vomiting, diarrhea nonbloody.  Unable to keep any food down.  Denies any LOC, chest pain, headache.  Plan is to check labs, check for electrolyte abnormalities, rehydrate with fluids and reassess.  If patient is able to tolerate p.o. after symptomatic treatment will likely discharge

## 2025-01-31 NOTE — ED PROVIDER NOTE - NSPTACCESSSVCSAPPTDETAILS_ED_ALL_ED_FT
Please set patient up with urgent GI follow up in the setting of elevated LFTs and CBD dilation possible intrinsic CBD pathology? Please set patient up with urgent GI follow up in the setting of elevated LFTs and CBD dilation possible intrinsic CBD pathology?  [phone] (MOM)

## 2025-01-31 NOTE — ED ADULT NURSE NOTE - OBJECTIVE STATEMENT
29 yr old female amb to ED with c/o nausea/dry retching x 2 days Denies fever or chills No one else in the house sick. Has H/O chronic lyme, migraines, PCOS on metformin.  Denies chest pain or sob Abd soft and nondist   Decreased appetite.

## 2025-01-31 NOTE — ED PROVIDER NOTE - NSFOLLOWUPINSTRUCTIONS_ED_ALL_ED_FT
You have been evaluated in the Emergency Department today for nausea/vomiting.      I have set you up with urgent GI follow-up, they will call you, please  and make an appointment to be seen.  A copy of your results has been provided to you.  As we spoke about I will send you a few days of nausea medication at your pharmacy, you can pick it up and take it as directed    Many things can cause abdominal pain. Many times, abdominal pain is not caused by a disease and will improve without treatment. Your health care provider will do a physical exam to determine if there is a dangerous cause of your pain; blood tests and imaging may help determine the cause of your pain. However, in many cases, no cause may be found and you may need further testing as an outpatient. Monitor your abdominal pain for any changes.     SEEK IMMEDIATE MEDICAL CARE IF YOU HAVE ANY OF THE FOLLOWING SYMPTOMS: worsening abdominal pain, uncontrollable vomiting, profuse diarrhea, inability to have bowel movements or pass gas, black or bloody stools, fever accompanying chest pain or back pain, or fainting. These symptoms may represent a serious problem that is an emergency. Do not wait to see if the symptoms will go away. Get medical help right away. Call 911 and do not drive yourself to the hospital.

## 2025-01-31 NOTE — ED PROVIDER NOTE - ATTENDING CONTRIBUTION TO CARE
29F hx chronic lyme, migraines, pcos pw 2 days of nausea and vomiting. pt on glp-1s which may be contributing to symptoms.   on exam rrr ,ctab, abd soft nt  will give fluids, anti emetics, reassess

## 2025-01-31 NOTE — ED ADULT TRIAGE NOTE - CHIEF COMPLAINT QUOTE
abdominal pain, nausea, vomiting x 1 week with worsening yesterday. denies red/bloody emesis. denies fevers/sick contacts/travel

## 2025-01-31 NOTE — ED PROVIDER NOTE - OBJECTIVE STATEMENT
29-year-old female past medical history of PCOS, hyperinsulinemia, chronic Lyme disease presents to the ED for evaluation of nausea/vomiting for the past 2 days.  Patient states she is on a ZEPBOUND shots and has nausea with it at baseline.  She states she usually has 1 episode of nausea, vomiting, diarrhea usually but over the past 2 days has had multiple episodes of vomiting, unable to keep any food down 29-year-old female past medical history of PCOS, hyperinsulinemia, chronic Lyme disease presents to the ED for evaluation of nausea/vomiting for the past 2 days.  Patient states she is on a ZEPBOUND shots and has nausea with it at baseline.  She states she usually has 1 episode of nausea, vomiting, diarrhea usually but over the past 2 days has had multiple episodes of vomiting, unable to keep any food down.  Denies any blood in vomit or stool.  No other sick contacts at home.  Denies chest pain, fevers, numbness, tingling, LOC or other concerning symptoms at this time.

## 2025-01-31 NOTE — ED PROVIDER NOTE - PROGRESS NOTE DETAILS
[facility] PGY-1 DO:   Patient has disproportionate AST to ALT elevation alongside some right upper quadrant tenderness, will assess for cholecystitis with right upper quadrant ultrasound.  Otherwise lab works not overly concerning, electrolytes are within normal limits.  Patient states she feels little bit better after medications, will reassess [facility] PGY-1 DO:   Patient's ultrasound is negative for cholecystitis however CBD is measured to be 0.6 mm.  Patient does have mildly elevated LFTs.  Will set up for urgent GI follow-up, patient and mom are aware.  Will send few days of Zofran ODT for symptomatic treatment.  Patient is tolerating p.o. here in the emergency room, afebrile, vital signs stable, stable for discharge with strict return precautions

## 2025-02-11 ENCOUNTER — APPOINTMENT (OUTPATIENT)
Dept: GASTROENTEROLOGY | Facility: CLINIC | Age: 30
End: 2025-02-11
Payer: COMMERCIAL

## 2025-02-11 DIAGNOSIS — K76.0 FATTY (CHANGE OF) LIVER, NOT ELSEWHERE CLASSIFIED: ICD-10-CM

## 2025-02-11 DIAGNOSIS — K83.8 OTHER SPECIFIED DISEASES OF BILIARY TRACT: ICD-10-CM

## 2025-02-11 DIAGNOSIS — E66.811 OBESITY, CLASS 1: ICD-10-CM

## 2025-02-11 DIAGNOSIS — R79.89 OTHER SPECIFIED ABNORMAL FINDINGS OF BLOOD CHEMISTRY: ICD-10-CM

## 2025-02-11 PROCEDURE — 99204 OFFICE O/P NEW MOD 45 MIN: CPT

## 2025-02-11 PROCEDURE — 36415 COLL VENOUS BLD VENIPUNCTURE: CPT

## 2025-02-12 LAB
AFP-TM SERPL-MCNC: <1.8 NG/ML
ALBUMIN SERPL ELPH-MCNC: 4.3 G/DL
ALP BLD-CCNC: 116 U/L
ALT SERPL-CCNC: 103 U/L
AMYLASE/CREAT SERPL: 67 U/L
ANION GAP SERPL CALC-SCNC: 14 MMOL/L
AST SERPL-CCNC: 50 U/L
BASOPHILS # BLD AUTO: 0.05 K/UL
BASOPHILS NFR BLD AUTO: 0.4 %
BILIRUB SERPL-MCNC: 0.3 MG/DL
BUN SERPL-MCNC: 15 MG/DL
CALCIUM SERPL-MCNC: 10.5 MG/DL
CHLORIDE SERPL-SCNC: 102 MMOL/L
CHOLEST SERPL-MCNC: 200 MG/DL
CO2 SERPL-SCNC: 21 MMOL/L
CREAT SERPL-MCNC: 0.75 MG/DL
EGFR: 112 ML/MIN/1.73M2
EOSINOPHIL # BLD AUTO: 0.2 K/UL
EOSINOPHIL NFR BLD AUTO: 1.5 %
GLUCOSE SERPL-MCNC: 85 MG/DL
HBV SURFACE AG SER QL: NONREACTIVE
HCT VFR BLD CALC: 43.9 %
HCV AB SER QL: NONREACTIVE
HCV S/CO RATIO: 0.08 S/CO
HDLC SERPL-MCNC: 41 MG/DL
HGB BLD-MCNC: 14.9 G/DL
IMM GRANULOCYTES NFR BLD AUTO: 0.2 %
LDLC SERPL CALC-MCNC: 111 MG/DL
LPL SERPL-CCNC: 49 U/L
LYMPHOCYTES # BLD AUTO: 3.78 K/UL
LYMPHOCYTES NFR BLD AUTO: 28.2 %
MAN DIFF?: NORMAL
MCHC RBC-ENTMCNC: 30 PG
MCHC RBC-ENTMCNC: 33.9 G/DL
MCV RBC AUTO: 88.5 FL
MONOCYTES # BLD AUTO: 0.88 K/UL
MONOCYTES NFR BLD AUTO: 6.6 %
NEUTROPHILS # BLD AUTO: 8.47 K/UL
NEUTROPHILS NFR BLD AUTO: 63.1 %
NONHDLC SERPL-MCNC: 159 MG/DL
PLATELET # BLD AUTO: 502 K/UL
POTASSIUM SERPL-SCNC: 4.4 MMOL/L
PROT SERPL-MCNC: 6.9 G/DL
RBC # BLD: 4.96 M/UL
RBC # FLD: 12.6 %
SODIUM SERPL-SCNC: 137 MMOL/L
TRIGL SERPL-MCNC: 277 MG/DL
WBC # FLD AUTO: 13.41 K/UL

## 2025-02-13 ENCOUNTER — TRANSCRIPTION ENCOUNTER (OUTPATIENT)
Age: 30
End: 2025-02-13

## 2025-02-20 ENCOUNTER — APPOINTMENT (OUTPATIENT)
Dept: MRI IMAGING | Facility: CLINIC | Age: 30
End: 2025-02-20
Payer: COMMERCIAL

## 2025-02-20 PROCEDURE — 74183 MRI ABD W/O CNTR FLWD CNTR: CPT

## 2025-02-20 PROCEDURE — A9585: CPT

## 2025-03-25 ENCOUNTER — APPOINTMENT (OUTPATIENT)
Dept: GASTROENTEROLOGY | Facility: CLINIC | Age: 30
End: 2025-03-25
Payer: COMMERCIAL

## 2025-03-25 VITALS
TEMPERATURE: 98 F | DIASTOLIC BLOOD PRESSURE: 80 MMHG | HEIGHT: 64 IN | WEIGHT: 166.13 LBS | HEART RATE: 94 BPM | OXYGEN SATURATION: 99 % | SYSTOLIC BLOOD PRESSURE: 110 MMHG | BODY MASS INDEX: 28.36 KG/M2 | RESPIRATION RATE: 14 BRPM

## 2025-03-25 DIAGNOSIS — R79.89 OTHER SPECIFIED ABNORMAL FINDINGS OF BLOOD CHEMISTRY: ICD-10-CM

## 2025-03-25 PROCEDURE — 99214 OFFICE O/P EST MOD 30 MIN: CPT

## 2025-04-01 ENCOUNTER — APPOINTMENT (OUTPATIENT)
Dept: INTERNAL MEDICINE | Facility: CLINIC | Age: 30
End: 2025-04-01
Payer: COMMERCIAL

## 2025-04-01 VITALS
RESPIRATION RATE: 15 BRPM | TEMPERATURE: 98.1 F | WEIGHT: 164 LBS | OXYGEN SATURATION: 96 % | BODY MASS INDEX: 28 KG/M2 | SYSTOLIC BLOOD PRESSURE: 120 MMHG | HEIGHT: 64 IN | DIASTOLIC BLOOD PRESSURE: 92 MMHG | HEART RATE: 81 BPM

## 2025-04-01 DIAGNOSIS — K83.8 OTHER SPECIFIED DISEASES OF BILIARY TRACT: ICD-10-CM

## 2025-04-01 DIAGNOSIS — R11.2 NAUSEA WITH VOMITING, UNSPECIFIED: ICD-10-CM

## 2025-04-01 PROCEDURE — 99214 OFFICE O/P EST MOD 30 MIN: CPT

## 2025-04-01 PROCEDURE — G2211 COMPLEX E/M VISIT ADD ON: CPT | Mod: NC

## 2025-04-01 RX ORDER — OMEPRAZOLE 40 MG/1
40 CAPSULE, DELAYED RELEASE ORAL
Qty: 14 | Refills: 0 | Status: ACTIVE | COMMUNITY
Start: 2025-04-01 | End: 1900-01-01

## 2025-04-04 ENCOUNTER — EMERGENCY (EMERGENCY)
Facility: HOSPITAL | Age: 30
LOS: 1 days | End: 2025-04-04
Attending: STUDENT IN AN ORGANIZED HEALTH CARE EDUCATION/TRAINING PROGRAM | Admitting: STUDENT IN AN ORGANIZED HEALTH CARE EDUCATION/TRAINING PROGRAM
Payer: COMMERCIAL

## 2025-04-04 VITALS
RESPIRATION RATE: 18 BRPM | OXYGEN SATURATION: 97 % | SYSTOLIC BLOOD PRESSURE: 138 MMHG | HEART RATE: 87 BPM | DIASTOLIC BLOOD PRESSURE: 97 MMHG | WEIGHT: 164.91 LBS | HEIGHT: 64 IN | TEMPERATURE: 97 F

## 2025-04-04 PROCEDURE — 99285 EMERGENCY DEPT VISIT HI MDM: CPT

## 2025-04-04 RX ORDER — LORAZEPAM 4 MG/ML
1 VIAL (ML) INJECTION ONCE
Refills: 0 | Status: DISCONTINUED | OUTPATIENT
Start: 2025-04-04 | End: 2025-04-04

## 2025-04-04 NOTE — ED PROVIDER NOTE - CLINICAL SUMMARY MEDICAL DECISION MAKING FREE TEXT BOX
29F PMH PCOS, hyperinsulinemia, chronic Lyme disease p/w worsening anxiety attacks for the past 4 days. She states it is constant, tremulous, in "fight/flight mode." States hse cannot sleep. Denies SI but at times feels irritable, feels like she wants to break things and wants to "hurt myself." Denies having a plan for suicide. Denies HI/AVH. Pt takes luvon in AM and propanolol in PM. She also takes CBD gummies for nausea/vomiting 29F PMH PCOS, hyperinsulinemia, chronic Lyme disease p/w worsening anxiety attacks for the past 4 days. She states it is constant, tremulous, in "fight/flight mode." States she cannot sleep. Denies SI but at times feels irritable, feels like she wants to break things and wants to "hurt myself." Denies having a plan for suicide. Denies HI/AVH. Pt takes luvox in AM and propanolol in PM. She also takes CBD gummies for nausea/vomiting but last use was 4-5 days ago    Gen: awake alert   HEENT: EOMI, normal conjunctiva, oral mucosa moist  Lung: CTAB, no respiratory distress, no wheezes/rhonchi/rales B/L, speaking in full sentences  CV: RRR  Abd: soft, NT, ND, no guarding, no rigidity, no rebound tenderness  MSK: no visible deformities, ROM normal in UE/LE  Neuro: No focal sensory or motor deficits, tremulous  Skin: Warm, well perfused, no leg swelling  Psych: normal affect, anxious appearing    DDx includes but not limited to: Likely decompensation of primary psychiatric illness. Lower concern for JOSE, lyte derangements, infectious etiology  Plan: psych labs, telepsych c/s, reassess symptoms    See Progress Notes for further updates on ED Course

## 2025-04-04 NOTE — ED PROVIDER NOTE - NSFOLLOWUPINSTRUCTIONS_ED_ALL_ED_FT
For anxiety, take Ativan 1mg twice daily as needed    Follow up with your Psychiatrist on Monday afternoon as previously scheduled    Return to the Emergency Department if you have any new or worsening symptoms

## 2025-04-04 NOTE — ED PROVIDER NOTE - PATIENT PORTAL LINK FT
You can access the FollowMyHealth Patient Portal offered by [facility] by registering at the following website: [email or web address]. By joining [employer]’s FollowMyHealth portal, you will also be able to view your health information using other applications (apps) compatible with our system.

## 2025-04-04 NOTE — ED PROVIDER NOTE - PROGRESS NOTE DETAILS
leukocytosis mild in nature and likely reactive; pt has had this in the past. The slight elevation in AST/ALT is baseline. I spoke to telepsych: they recommend short course of PRN ativan for any anxiety until she sees her psychiatrist on Monday afternoon. I explained plan and results to both pt and mother. all quests answered. will dc to home with strict return precautions

## 2025-04-04 NOTE — ED ADULT TRIAGE NOTE - CHIEF COMPLAINT QUOTE
c/o anxiety attacks, SI thoughts when anxiety/panic episodes begin. Pt denies SI thoughts at this time. .as per mother she states that her daughter will begin to act impulsive and have intrusive thoughts.  as per pt states she has been feeling more anxious than usual.  hx  autisms, Asperger's,  OCD, anxiety, depression, chronic muscle spasms, migraine, psych medication, THC gummies?. c/o anxiety attacks, SI thoughts when anxiety/panic episodes begin. Pt denies SI thoughts at this time. .as per mother she states that her daughter will begin to act impulsive and have intrusive thoughts.  as per pt states she has been feeling more anxious than usual.  hx  autisms, Asperger's,  OCD, anxiety, depression, chronic muscle spasms, migraine, psych medication, THC gummies?.    1947 ANM made aware of chief complaint . pt continues to deny SI at this time. Pt noted to be calm and cooperative at this time. accompanied with mother.

## 2025-04-05 VITALS
TEMPERATURE: 98 F | RESPIRATION RATE: 18 BRPM | HEART RATE: 90 BPM | DIASTOLIC BLOOD PRESSURE: 82 MMHG | OXYGEN SATURATION: 98 % | SYSTOLIC BLOOD PRESSURE: 115 MMHG

## 2025-04-05 DIAGNOSIS — F84.0 AUTISTIC DISORDER: ICD-10-CM

## 2025-04-05 DIAGNOSIS — F42.2 MIXED OBSESSIONAL THOUGHTS AND ACTS: ICD-10-CM

## 2025-04-05 DIAGNOSIS — F90.2 ATTENTION-DEFICIT HYPERACTIVITY DISORDER, COMBINED TYPE: ICD-10-CM

## 2025-04-05 LAB
ALBUMIN SERPL ELPH-MCNC: 3.9 G/DL — SIGNIFICANT CHANGE UP (ref 3.3–5)
ALP SERPL-CCNC: 120 U/L — SIGNIFICANT CHANGE UP (ref 40–120)
ALT FLD-CCNC: 136 U/L — HIGH (ref 12–78)
ANION GAP SERPL CALC-SCNC: 12 MMOL/L — SIGNIFICANT CHANGE UP (ref 5–17)
APAP SERPL-MCNC: <2 UG/ML — SIGNIFICANT CHANGE UP (ref 10–30)
AST SERPL-CCNC: 62 U/L — HIGH (ref 15–37)
BASOPHILS # BLD AUTO: 0.06 K/UL — SIGNIFICANT CHANGE UP (ref 0–0.2)
BASOPHILS NFR BLD AUTO: 0.4 % — SIGNIFICANT CHANGE UP (ref 0–2)
BILIRUB SERPL-MCNC: 0.6 MG/DL — SIGNIFICANT CHANGE UP (ref 0.2–1.2)
BUN SERPL-MCNC: 16 MG/DL — SIGNIFICANT CHANGE UP (ref 7–23)
CALCIUM SERPL-MCNC: 9.7 MG/DL — SIGNIFICANT CHANGE UP (ref 8.5–10.1)
CHLORIDE SERPL-SCNC: 104 MMOL/L — SIGNIFICANT CHANGE UP (ref 96–108)
CO2 SERPL-SCNC: 22 MMOL/L — SIGNIFICANT CHANGE UP (ref 22–31)
CREAT SERPL-MCNC: 0.88 MG/DL — SIGNIFICANT CHANGE UP (ref 0.5–1.3)
EGFR: 92 ML/MIN/1.73M2 — SIGNIFICANT CHANGE UP
EGFR: 92 ML/MIN/1.73M2 — SIGNIFICANT CHANGE UP
EOSINOPHIL # BLD AUTO: 0.15 K/UL — SIGNIFICANT CHANGE UP (ref 0–0.5)
EOSINOPHIL NFR BLD AUTO: 1 % — SIGNIFICANT CHANGE UP (ref 0–6)
ETHANOL SERPL-MCNC: <10 MG/DL — SIGNIFICANT CHANGE UP (ref 0–10)
FLUAV AG NPH QL: SIGNIFICANT CHANGE UP
FLUBV AG NPH QL: SIGNIFICANT CHANGE UP
GLUCOSE SERPL-MCNC: 102 MG/DL — HIGH (ref 70–99)
HCG SERPL-ACNC: <1 MIU/ML — SIGNIFICANT CHANGE UP
HCT VFR BLD CALC: 43.1 % — SIGNIFICANT CHANGE UP (ref 34.5–45)
HGB BLD-MCNC: 15.5 G/DL — SIGNIFICANT CHANGE UP (ref 11.5–15.5)
IMM GRANULOCYTES NFR BLD AUTO: 0.3 % — SIGNIFICANT CHANGE UP (ref 0–0.9)
LYMPHOCYTES # BLD AUTO: 28.2 % — SIGNIFICANT CHANGE UP (ref 13–44)
LYMPHOCYTES # BLD AUTO: 4.16 K/UL — HIGH (ref 1–3.3)
MCHC RBC-ENTMCNC: 30.6 PG — SIGNIFICANT CHANGE UP (ref 27–34)
MCHC RBC-ENTMCNC: 36 G/DL — SIGNIFICANT CHANGE UP (ref 32–36)
MCV RBC AUTO: 85 FL — SIGNIFICANT CHANGE UP (ref 80–100)
MONOCYTES # BLD AUTO: 1.11 K/UL — HIGH (ref 0–0.9)
MONOCYTES NFR BLD AUTO: 7.5 % — SIGNIFICANT CHANGE UP (ref 2–14)
NEUTROPHILS # BLD AUTO: 9.23 K/UL — HIGH (ref 1.8–7.4)
NEUTROPHILS NFR BLD AUTO: 62.6 % — SIGNIFICANT CHANGE UP (ref 43–77)
NRBC BLD AUTO-RTO: 0 /100 WBCS — SIGNIFICANT CHANGE UP (ref 0–0)
PCP SPEC-MCNC: SIGNIFICANT CHANGE UP
PLATELET # BLD AUTO: 484 K/UL — HIGH (ref 150–400)
POTASSIUM SERPL-MCNC: 4 MMOL/L — SIGNIFICANT CHANGE UP (ref 3.5–5.3)
POTASSIUM SERPL-SCNC: 4 MMOL/L — SIGNIFICANT CHANGE UP (ref 3.5–5.3)
PROT SERPL-MCNC: 7.9 G/DL — SIGNIFICANT CHANGE UP (ref 6–8.3)
RBC # BLD: 5.07 M/UL — SIGNIFICANT CHANGE UP (ref 3.8–5.2)
RBC # FLD: 12.2 % — SIGNIFICANT CHANGE UP (ref 10.3–14.5)
RSV RNA NPH QL NAA+NON-PROBE: SIGNIFICANT CHANGE UP
SALICYLATES SERPL-MCNC: <1.7 MG/DL — LOW (ref 2.8–20)
SARS-COV-2 RNA SPEC QL NAA+PROBE: SIGNIFICANT CHANGE UP
SODIUM SERPL-SCNC: 138 MMOL/L — SIGNIFICANT CHANGE UP (ref 135–145)
SOURCE RESPIRATORY: SIGNIFICANT CHANGE UP
TSH SERPL-MCNC: 3.61 UIU/ML — SIGNIFICANT CHANGE UP (ref 0.36–3.74)
WBC # BLD: 14.76 K/UL — HIGH (ref 3.8–10.5)
WBC # FLD AUTO: 14.76 K/UL — HIGH (ref 3.8–10.5)

## 2025-04-05 PROCEDURE — 93010 ELECTROCARDIOGRAM REPORT: CPT

## 2025-04-05 PROCEDURE — 93005 ELECTROCARDIOGRAM TRACING: CPT

## 2025-04-05 PROCEDURE — 36415 COLL VENOUS BLD VENIPUNCTURE: CPT

## 2025-04-05 PROCEDURE — 84443 ASSAY THYROID STIM HORMONE: CPT

## 2025-04-05 PROCEDURE — 85025 COMPLETE CBC W/AUTO DIFF WBC: CPT

## 2025-04-05 PROCEDURE — 87637 SARSCOV2&INF A&B&RSV AMP PRB: CPT

## 2025-04-05 PROCEDURE — 80053 COMPREHEN METABOLIC PANEL: CPT

## 2025-04-05 PROCEDURE — 80307 DRUG TEST PRSMV CHEM ANLYZR: CPT

## 2025-04-05 PROCEDURE — 84702 CHORIONIC GONADOTROPIN TEST: CPT

## 2025-04-05 PROCEDURE — 99284 EMERGENCY DEPT VISIT MOD MDM: CPT

## 2025-04-05 PROCEDURE — 90792 PSYCH DIAG EVAL W/MED SRVCS: CPT | Mod: 95

## 2025-04-05 RX ORDER — LORAZEPAM 4 MG/ML
1 VIAL (ML) INJECTION
Qty: 6 | Refills: 0
Start: 2025-04-05 | End: 2025-04-07

## 2025-04-05 RX ADMIN — Medication 1 MILLIGRAM(S): at 00:17

## 2025-04-05 NOTE — ED ADULT NURSE REASSESSMENT NOTE - CAS EDN INTEG ASSESS
Spoke with Dr. Davis, he stated to hold the Diltiazem until a later time due to pt . He stated to give at a later time if pt HR exceeds 120.  
- - -

## 2025-04-05 NOTE — ED BEHAVIORAL HEALTH ASSESSMENT NOTE - SUMMARY
The patient is a 29-year-old woman, lives with parents, unemployed, with PMH of PCOS, hyperinsulinemia, chronic Lyme disease, and PPH of OCD, ADHD, ASD, no prior hospitalizations, no trauma history, no SA/NSSI, in outpatient treatment with psychiatrist Dr. Callaway, who presents with worsening anxiety for the past 5 days.     The patient presents with worsening OCD symptoms of intrusive thoughts in the setting of THC gummy usage. The patient has had OCD in the past which appears to be exacerbated. It’s likely related to her use of THC gummies and subsequent discontinuation as well. The patient has responded well to Ativan in the ED and Valium in the home. She has some thoughts of death but that they appear to be intrusive in nature. Collateral from the patient’s mother does not reveal any acute safety concerns at this time. The patient has strong protective factors including family support, help-seeking behavior, compliance with treatment, good therapeutic relationship with her psychiatrist, and ability to safety plan. As a result, the patient does not warrant inpatient admission at this time. There are no psychiatric barriers to discharge.     Plan:   -- treat and release   -- patient has outpatient appointment with Dr. Callaway on 4/7 at 2:20PM   -- continue home meds: Luvox 150mg daily   -- patient to received 3 day script (6 pills) of Ativan 1mg BID to take home to use as needed until she sees her psychiatrist

## 2025-04-05 NOTE — ED BEHAVIORAL HEALTH ASSESSMENT NOTE - DESCRIPTION
PCOS, hyperinsulinemia, chronic Lyme disease lives with parents, unemployed The patient received PO Ativan 1mg at 23:57 with good effect.

## 2025-04-05 NOTE — ED ADULT NURSE NOTE - CHIEF COMPLAINT QUOTE
c/o anxiety attacks, SI thoughts when anxiety/panic episodes begin. Pt denies SI thoughts at this time. .as per mother she states that her daughter will begin to act impulsive and have intrusive thoughts.  as per pt states she has been feeling more anxious than usual.  hx  autisms, Asperger's,  OCD, anxiety, depression, chronic muscle spasms, migraine, psych medication, THC gummies?.    1947 ANM made aware of chief complaint . pt continues to deny SI at this time. Pt noted to be calm and cooperative at this time. accompanied with mother.

## 2025-04-05 NOTE — ED ADULT NURSE REASSESSMENT NOTE - NS ED NURSE REASSESS COMMENT FT1
Discharged to home with instruct to take Ativan only if needed. Advised on home storage safety. Instructed to follow up with Psych provider as discussed by ED provider. Return to us if symptoms return>verbalized understanding

## 2025-04-05 NOTE — ED BEHAVIORAL HEALTH ASSESSMENT NOTE - HPI (INCLUDE ILLNESS QUALITY, SEVERITY, DURATION, TIMING, CONTEXT, MODIFYING FACTORS, ASSOCIATED SIGNS AND SYMPTOMS)
The patient is a 29-year-old woman, lives with parents, unemployed, with PMH of PCOS, hyperinsulinemia, chronic Lyme disease, and PPH of OCD, ADHD, ASD, no prior hospitalizations, no trauma history, no SA/NSSI, in outpatient treatment with psychiatrist Dr. Callaway, who presents with worsening anxiety for the past 5 days.     The patient is calm, cooperative, answering questions appropriately, not in acute distress.     The patient received PO Ativan 1mg at 23:57 with good effect.     The patient states that since Monday, she has been having non-stop panic attacks and fight or flight responses. It has been getting worse and worse every day. The patient has hsitory of OCD and ADHD. She has a tendency to dwell on things. Her mother calls them “OCD thoughts.” She will have intrusive thoughts about obsessing about death and fear of there not having an afterlife. She would look up scientific information to try and prove things. She thinks if everything means nothing then why is it all worth it. She doesn’t know where this trigger came from. She thinks of near-death experiences despite never having had one. She looks up what people who have gone through it have experienced and gets really worked up about what happens after one dies. The thought of a “void” after death brings her feelings of sadness of what happens to her loved ones. This becomes a never-ending loop for her. When anxious, she’ll have tremulousness, rapid heart rate, increased temperature, nausea. This can last all day.     When not in the right mindset, she gets over-stimulated, and she wants to rip things off the wall and has thoughts of going into traffic but has no desire to act on it. She denies any SA. Starting yesterday, she was scratching herself/biting herself due to anxiety. When anxious, she doesn’t feel anything, so she needs to create physical pain. She stops after getting a stinging sensation.     The patient denies any current SI, HI, AVH, or PI. She wants to live for her family and her girlfriend. She has a good relationship with her psychiatrist who she will see on Monday, 4/7.     The patient was able to safety plan.     Triggers/warning signs: intrusive thoughts     Coping: watch Youtube, playing video games, doing art     Support: mother, father, girlfriend (Dotty)     Emergency: call [phone] or 911 or go to the nearest ED     Lives for: girlfriend          The patient gave consent to speak to her mother for collateral.      Collateral from mother, Yenifer, [phone]:     The patient struggles with OCD longstanding. She has been in and out of therapy. Her psychiatrist has worked with her since she was a child and is someone they trust. As a child, she was very hyper, and she was started on stimulants which made her nervous. Then she started sleepwalking and would have a vasal vagal response. She was put on Zoloft for that. When in 11th grade, her anxiety got really bad and she became a germaphobe and refused to touch doorknobs or swallow her saliva. She was started on Luvox 150mg daily. It was really helpful for a number of years. Yenifer says that the patient is having some GI issues due to PCOS. She was on Wegovy and Zepbound and had some negative reactions. She tried some gummies (low-dose THC) which helped her GI pain and nausea. She last took a gummy on Monday and she “snapped” and was having panic attacks. She stopped the gummies. She had leftover prescription of diazepam 5mg and she was given this in the morning. At night, she would get 50mg Benadryl. She thought that after a few days of coming off the gummies things would get better, but the patient is still struggling. She otherwise has no safety concerns and says the patient has never had any SA and she has no acute safety concerns. She is going to stay with the patient all weekend. She is amenable with receiving a script for Ativan to help the patient in acute moments to hold her until she sees her outpatient psychiatrist, Dr. Callaway on Monday at 2:20PM.

## 2025-04-05 NOTE — ED BEHAVIORAL HEALTH ASSESSMENT NOTE - RISK ASSESSMENT
Risk: worsening panic attacks, CBD usage recently  Protective: family support, stable home, compliance with treatment, good relationship with psychiatrist

## 2025-04-05 NOTE — ED ADULT NURSE NOTE - NSFALLUNIVINTERV_ED_ALL_ED
Bed/Stretcher in lowest position, wheels locked, appropriate side rails in place/Call bell, personal items and telephone in reach/Instruct patient to call for assistance before getting out of bed/chair/stretcher/Non-slip footwear applied when patient is off stretcher/[place] to call system/Physically safe environment - no spills, clutter or unnecessary equipment/Purposeful proactive rounding/Room/bathroom lighting operational, light cord in reach

## 2025-04-05 NOTE — ED BEHAVIORAL HEALTH ASSESSMENT NOTE - ORAL MEDICATION DETAILS
August 29, 2017      Mateo Lambert,   [address] [address] [address]           [facility]  [address] [facility] [address]  Phone: [phone]  Fax: [phone]          Patient: Crow Green   MR Number: [id number]   YOB: 1955   Date of Visit: 8/29/2017       Dear Dr. Mateo Lambert:    Thank you for referring Crow Green to me for evaluation. Attached you will find relevant portions of my assessment and plan of care.    If you have questions, please do not hesitate to call me. I look forward to following Crow Green along with you.    Sincerely,    Eduard Zhao MD    Enclosure  CC:  No Recipients    If you would like to receive this communication electronically, please contact [email or web address] or [phone] to request more information on [email or web address] Link access.    For providers and/or their staff who would like to refer a patient to [facility], please contact us through our one-stop-shop provider referral line, [facility], at [phone].    If you feel you have received this communication in error or would no longer like to receive these types of communications, please e-mail [email or web address]          see above

## 2025-04-05 NOTE — ED BEHAVIORAL HEALTH ASSESSMENT NOTE - NSBHATTESTBILLING_PSY_A_CORE
12/18/2020 last office visit   1/21/2021 next appt   11/25/2020 last rx  60 vials  #2 refills    [id number]-Mcperhknxsx diagnostic evaluation with medical services

## 2025-04-05 NOTE — ED ADULT NURSE NOTE - OBJECTIVE STATEMENT
29 y.o female presents to ED with chief complaint of anxiety. Pt states they have been feeling anxious about the unknown. Pt denies any suicidal ideations at this time,

## 2025-04-05 NOTE — ED ADULT NURSE REASSESSMENT NOTE - NSFALLRISKINTERV_ED_ALL_ED

## 2025-04-05 NOTE — ED ADULT NURSE NOTE - ISOLATION TYPE:
The patient is Stable - Low risk of patient condition declining or worsening    Shift Goals  Clinical Goals: pain management          Problem: Pain - Standard  Goal: Alleviation of pain or a reduction in pain to the patient’s comfort goal  Outcome: Not Met     Problem: Knowledge Deficit - Standard  Goal: Patient and family/care givers will demonstrate understanding of plan of care, disease process/condition, diagnostic tests and medications  Outcome: Progressing      None

## 2025-04-08 ENCOUNTER — EMERGENCY (EMERGENCY)
Facility: HOSPITAL | Age: 30
LOS: 1 days | End: 2025-04-08
Attending: EMERGENCY MEDICINE
Payer: COMMERCIAL

## 2025-04-08 VITALS
DIASTOLIC BLOOD PRESSURE: 63 MMHG | SYSTOLIC BLOOD PRESSURE: 101 MMHG | RESPIRATION RATE: 18 BRPM | HEART RATE: 73 BPM | TEMPERATURE: 98 F | OXYGEN SATURATION: 97 %

## 2025-04-08 VITALS
RESPIRATION RATE: 18 BRPM | HEIGHT: 64 IN | DIASTOLIC BLOOD PRESSURE: 93 MMHG | HEART RATE: 104 BPM | SYSTOLIC BLOOD PRESSURE: 15 MMHG | WEIGHT: 160.06 LBS | OXYGEN SATURATION: 95 % | TEMPERATURE: 98 F

## 2025-04-08 LAB
ALBUMIN SERPL ELPH-MCNC: 4.2 G/DL — SIGNIFICANT CHANGE UP (ref 3.3–5)
ALP SERPL-CCNC: 101 U/L — SIGNIFICANT CHANGE UP (ref 40–120)
ALT FLD-CCNC: 135 U/L — HIGH (ref 10–45)
ANION GAP SERPL CALC-SCNC: 18 MMOL/L — HIGH (ref 5–17)
AST SERPL-CCNC: 60 U/L — HIGH (ref 10–40)
BASOPHILS # BLD AUTO: 0.04 K/UL — SIGNIFICANT CHANGE UP (ref 0–0.2)
BASOPHILS NFR BLD AUTO: 0.5 % — SIGNIFICANT CHANGE UP (ref 0–2)
BILIRUB SERPL-MCNC: 0.4 MG/DL — SIGNIFICANT CHANGE UP (ref 0.2–1.2)
BUN SERPL-MCNC: 17 MG/DL — SIGNIFICANT CHANGE UP (ref 7–23)
CALCIUM SERPL-MCNC: 9.7 MG/DL — SIGNIFICANT CHANGE UP (ref 8.4–10.5)
CHLORIDE SERPL-SCNC: 102 MMOL/L — SIGNIFICANT CHANGE UP (ref 96–108)
CO2 SERPL-SCNC: 18 MMOL/L — LOW (ref 22–31)
CREAT SERPL-MCNC: 0.82 MG/DL — SIGNIFICANT CHANGE UP (ref 0.5–1.3)
EGFR: 100 ML/MIN/1.73M2 — SIGNIFICANT CHANGE UP
EGFR: 100 ML/MIN/1.73M2 — SIGNIFICANT CHANGE UP
EOSINOPHIL # BLD AUTO: 0.13 K/UL — SIGNIFICANT CHANGE UP (ref 0–0.5)
EOSINOPHIL NFR BLD AUTO: 1.5 % — SIGNIFICANT CHANGE UP (ref 0–6)
GLUCOSE SERPL-MCNC: 85 MG/DL — SIGNIFICANT CHANGE UP (ref 70–99)
HCG SERPL-ACNC: <2 MIU/ML — SIGNIFICANT CHANGE UP
HCT VFR BLD CALC: 41.2 % — SIGNIFICANT CHANGE UP (ref 34.5–45)
HGB BLD-MCNC: 14.4 G/DL — SIGNIFICANT CHANGE UP (ref 11.5–15.5)
IMM GRANULOCYTES NFR BLD AUTO: 0.5 % — SIGNIFICANT CHANGE UP (ref 0–0.9)
LIDOCAIN IGE QN: 27 U/L — SIGNIFICANT CHANGE UP (ref 7–60)
LYMPHOCYTES # BLD AUTO: 3.2 K/UL — SIGNIFICANT CHANGE UP (ref 1–3.3)
LYMPHOCYTES # BLD AUTO: 36.4 % — SIGNIFICANT CHANGE UP (ref 13–44)
MCHC RBC-ENTMCNC: 30.6 PG — SIGNIFICANT CHANGE UP (ref 27–34)
MCHC RBC-ENTMCNC: 35 G/DL — SIGNIFICANT CHANGE UP (ref 32–36)
MCV RBC AUTO: 87.7 FL — SIGNIFICANT CHANGE UP (ref 80–100)
MONOCYTES # BLD AUTO: 0.82 K/UL — SIGNIFICANT CHANGE UP (ref 0–0.9)
MONOCYTES NFR BLD AUTO: 9.3 % — SIGNIFICANT CHANGE UP (ref 2–14)
NEUTROPHILS # BLD AUTO: 4.55 K/UL — SIGNIFICANT CHANGE UP (ref 1.8–7.4)
NEUTROPHILS NFR BLD AUTO: 51.8 % — SIGNIFICANT CHANGE UP (ref 43–77)
NRBC BLD AUTO-RTO: 0 /100 WBCS — SIGNIFICANT CHANGE UP (ref 0–0)
PLATELET # BLD AUTO: 347 K/UL — SIGNIFICANT CHANGE UP (ref 150–400)
POTASSIUM SERPL-MCNC: 3.7 MMOL/L — SIGNIFICANT CHANGE UP (ref 3.5–5.3)
POTASSIUM SERPL-SCNC: 3.7 MMOL/L — SIGNIFICANT CHANGE UP (ref 3.5–5.3)
PROT SERPL-MCNC: 6.9 G/DL — SIGNIFICANT CHANGE UP (ref 6–8.3)
RBC # BLD: 4.7 M/UL — SIGNIFICANT CHANGE UP (ref 3.8–5.2)
RBC # FLD: 11.9 % — SIGNIFICANT CHANGE UP (ref 10.3–14.5)
SODIUM SERPL-SCNC: 138 MMOL/L — SIGNIFICANT CHANGE UP (ref 135–145)
TROPONIN T, HIGH SENSITIVITY RESULT: <6 NG/L — SIGNIFICANT CHANGE UP (ref 0–51)
WBC # BLD: 8.78 K/UL — SIGNIFICANT CHANGE UP (ref 3.8–10.5)
WBC # FLD AUTO: 8.78 K/UL — SIGNIFICANT CHANGE UP (ref 3.8–10.5)

## 2025-04-08 PROCEDURE — 80053 COMPREHEN METABOLIC PANEL: CPT

## 2025-04-08 PROCEDURE — 93005 ELECTROCARDIOGRAM TRACING: CPT

## 2025-04-08 PROCEDURE — 99284 EMERGENCY DEPT VISIT MOD MDM: CPT | Mod: 25

## 2025-04-08 PROCEDURE — 85025 COMPLETE CBC W/AUTO DIFF WBC: CPT

## 2025-04-08 PROCEDURE — 93010 ELECTROCARDIOGRAM REPORT: CPT

## 2025-04-08 PROCEDURE — 96374 THER/PROPH/DIAG INJ IV PUSH: CPT

## 2025-04-08 PROCEDURE — 99284 EMERGENCY DEPT VISIT MOD MDM: CPT

## 2025-04-08 PROCEDURE — 84484 ASSAY OF TROPONIN QUANT: CPT

## 2025-04-08 PROCEDURE — 83690 ASSAY OF LIPASE: CPT

## 2025-04-08 PROCEDURE — 84702 CHORIONIC GONADOTROPIN TEST: CPT

## 2025-04-08 RX ORDER — ONDANSETRON HCL/PF 4 MG/2 ML
4 VIAL (ML) INJECTION ONCE
Refills: 0 | Status: COMPLETED | OUTPATIENT
Start: 2025-04-08 | End: 2025-04-08

## 2025-04-08 RX ORDER — KETOROLAC TROMETHAMINE 30 MG/ML
15 INJECTION, SOLUTION INTRAMUSCULAR; INTRAVENOUS ONCE
Refills: 0 | Status: DISCONTINUED | OUTPATIENT
Start: 2025-04-08 | End: 2025-04-08

## 2025-04-08 RX ADMIN — Medication 4 MILLIGRAM(S): at 02:11

## 2025-04-08 RX ADMIN — Medication 1000 MILLILITER(S): at 02:08

## 2025-04-08 NOTE — ED PROVIDER NOTE - PROGRESS NOTE DETAILS
Patient feeling significantly better. Mom notes that she will call her psychiatrist in the morning for an appointment. Patient able to tolerate PO.  Bibiana Rice DO (PGY1)

## 2025-04-08 NOTE — ED PROVIDER NOTE - CLINICAL SUMMARY MEDICAL DECISION MAKING FREE TEXT BOX
29-year-old female past medical history of PCOS, hyperinsulinemia, chronic Lyme disease, migraines on propranolol presents to the ED with complaints of headache and neck stiffness starting last night.  Patient states that around 7 PM last night (8 hours ago) she started a new medication Abilify, notes having dinner afterwards and proceeding with her night.  States that around 9 PM she had her propranolol and the rest of her medications.  Notes that an hour later she felt "off" and vomited.  Notes seeing strobe lights, along with having some neck stiffness and headache.  Also states that she had some chest discomfort.  Currently states that the chest discomfort has resolved however has the headache on the left side of her head that feels "swollen" and is worse with light.  States that it was a gradual onset and that feels similar to her headaches in the past.  Mom notes that she has given the patient CBD Gummies to help with her GI symptoms.  Reports having on and off nausea and vomiting, follows up with a gastroenterologist, has gotten multiple MRIs and CT scans without any findings. Denies any fevers, chills, chest pain, shortness of breath, abdominal pain.  ROS positive for nausea.    Vitals show blood pressure elevated at 150s over 90s.  Otherwise tachycardic.  Patient is afebrile.  On physical exam, patient is well-appearing and squinting her eyes to the light.  No neck stiffness noted on exam, no C-spine tenderness, full range of motion in the neck.  Patient is neurovascularly intact, lung and heart unremarkable.  No abdominal tenderness.    Consider migraines versus electrolyte imbalances versus dehydration.  Will obtain labs to evaluate for signs of infection or electrolyte imbalances. Will treat with Zofran and fluids. 29-year-old female past medical history of PCOS, hyperinsulinemia, chronic Lyme disease, migraines on propranolol presents to the ED with complaints of headache and neck stiffness starting last night.  Patient states that around 7 PM last night (8 hours ago) she started a new medication Abilify, notes having dinner afterwards and proceeding with her night.  States that around 9 PM she had her propranolol and the rest of her medications.  Notes that an hour later she felt "off" and vomited.  Notes seeing strobe lights, along with having some neck stiffness and headache.  Also states that she had some chest discomfort.  Currently states that the chest discomfort has resolved however has the headache on the left side of her head that feels "swollen" and is worse with light.  States that it was a gradual onset and that feels similar to her headaches in the past.  Mom notes that she has given the patient CBD Gummies to help with her GI symptoms.  Reports having on and off nausea and vomiting, follows up with a gastroenterologist, has gotten multiple MRIs and CT scans without any findings. Denies any fevers, chills, chest pain, shortness of breath, abdominal pain.  ROS positive for nausea.    Vitals show blood pressure elevated at 150s over 90s.  Otherwise tachycardic.  Patient is afebrile.  On physical exam, patient is well-appearing and squinting her eyes to the light.  No neck stiffness noted on exam, no C-spine tenderness, full range of motion in the neck.  Patient is neurovascularly intact, lung and heart unremarkable.  No abdominal tenderness.    Consider migraines versus electrolyte imbalances versus dehydration.  Will obtain labs to evaluate for signs of infection or electrolyte imbalances. Will treat with Zofran and fluids.    Patricio: 29 year old female pmhx of PCOS, chronic lyme disease, presents to the ER complaints of headache and neck stiffness starting last night.  started at 7 pom last night. headache c/w prior headaches. symptoms started after abilify use last night for first time. felt off and vomited.  also c/o chest discomfort. PE: att exam: patient awake alert NAD . LUNGS CTAB no wheeze no crackle. CARD RRR no m/r/g.  Abdomen soft NT ND no rebound no guarding no CVA tenderness. EXT WWP no edema no calf tenderness CV 2+DP/PT bilaterally. neuro A&Ox3 gait normal.  skin warm and dry no rash  plan: will get labs, symptomatic treatment for migraine, ekg, reasess.

## 2025-04-08 NOTE — ED ADULT TRIAGE NOTE - CHIEF COMPLAINT QUOTE
took first dose today of Abilify; c/o chest discomfort, seeing flashing lights, vomited; PMH anxiety, PCOS.

## 2025-04-08 NOTE — ED PROVIDER NOTE - PATIENT PORTAL LINK FT
You can access the FollowMyHealth Patient Portal offered by [facility] by registering at the following website: [email or web address]. By joining [employer]’s FollowMyHealth portal, you will also be able to view your health information using other applications (apps) compatible with our system.

## 2025-04-08 NOTE — ED PROVIDER NOTE - PHYSICAL EXAMINATION
Bibiana Rice DO (PGY1)   Physical Exam:    Gen: NAD, AOx3  Head: NCAT  HEENT: EOMI, PEERL  Lung: CTAB, no respiratory distress, no wheezes/rhonchi/rales B/L  CV: RRR, no murmurs, rubs or gallops  Abd: soft, NT, ND, no guarding, no rigidity, no rebound tenderness, no CVA tenderness   MSK: no visible deformities, ROM normal in UE/LE, no back pain  Neuro: No focal sensory or motor deficits. Sensation intact to light touch all extremities. CNII-XII  Skin: Warm, well perfused, no rash, no leg swelling  Psych: normal affect, calm

## 2025-04-08 NOTE — ED ADULT NURSE NOTE - OBJECTIVE STATEMENT
PT is a 29 y.o female c.o chest discomfort. PT is A&OX4 resting in stretcher accompanied by her mother. PT states she started a new medication, Abilify, and took the first dose after dinner. PT states about x1 hour after she felt "off" with vomiting, +strobe light, neck pain, an headache. PT states on arrival, chest discomfort resolved. Spontaneously breathing on RA>95%, abdomen soft nontender, peripheral pulses present, skin dry and intact. PT denies any SOB, fever, chills, dysuria, hematuria, or known sick contacts. Safety and comfort measures initiated- bed placed in lowest position and side rails raised.

## 2025-04-08 NOTE — ED PROVIDER NOTE - NSFOLLOWUPINSTRUCTIONS_ED_ALL_ED_FT
- You were seen in the emergency department today for headache.    - Lab results were discussed with you along with your discharge diagnosis.     - Follow up with your doctor in 1 week - bring copies of your results. Schedule an appointment with your psychiatrist to address the potential intolerance to the new medication.    - Return to the ED for any new, worsening, or concerning symptoms to you.    - Continue all prescribed medications.    - Take ibuprofen/tylenol as directed as needed for pain.     - Rest and keep yourself hydrated with fluids.